# Patient Record
Sex: FEMALE | Race: WHITE | NOT HISPANIC OR LATINO | Employment: UNEMPLOYED | URBAN - METROPOLITAN AREA
[De-identification: names, ages, dates, MRNs, and addresses within clinical notes are randomized per-mention and may not be internally consistent; named-entity substitution may affect disease eponyms.]

---

## 2017-05-03 ENCOUNTER — ALLSCRIPTS OFFICE VISIT (OUTPATIENT)
Dept: OTHER | Facility: OTHER | Age: 42
End: 2017-05-03

## 2017-11-02 ENCOUNTER — ALLSCRIPTS OFFICE VISIT (OUTPATIENT)
Dept: OTHER | Facility: OTHER | Age: 42
End: 2017-11-02

## 2018-01-09 NOTE — PSYCH
Psych Med Mgmt    Appearance: was calm and cooperative, adequate hygiene and grooming and good eye contact  Observed mood: anxious  Observed mood: affect was constricted  Speech: a normal rate and fluent  Thought processes: coherent/organized  Hallucinations: no hallucinations present  Thought Content: no delusions  Abnormal Thoughts: The patient has no suicidal thoughts and no homicidal thoughts  Orientation: The patient is oriented to person, place and time, oriented to person, oriented to place and oriented to time  Recent and Remote Memory: short term memory intact and long term memory intact  Attention Span And Concentration: concentration impaired  Insight: Limited insight  Judgment: Her judgment was limited  Muscle Strength And Tone  Muscle strength and tone were normal    The patient is experiencing no localized pain  Goals addressed in session: medication management       Treatment Recommendations: continue current treatment  Risks, Benefits And Possible Side Effects Of Medications: Risks, benefits, and possible side effects of medications explained to patient and patient verbalizes understanding  Mood has improved  Reported less anxiety and stated that she is not currently experiencing side effects   No health changes   No new medications  Assessment    1  Generalized anxiety disorder (300 02) (F41 1)   2  Attention-deficit/hyperactivity disorder (314 01) (F90 9)    Plan    1  Sertraline HCl - 100 MG Oral Tablet; TAKE 1 TABLET ONCE DAILY    2  Amphetamine-Dextroamphet ER 10 MG Oral Capsule Extended Release 24   Hour; TAKE 1 CAPSULE DAILY FOR ADHD; Do Not Fill Before: 11Tob5035    Review of Systems    Constitutional: as noted in HPI  Substance Abuse Hx    Substance Abuse History: Denies  Active Problems    1  Attention-deficit/hyperactivity disorder (314 01) (F90 9)   2  Generalized anxiety disorder (300 02) (F41 1)   3   Vitamin D deficiency (268 9) (E55 9)    Past Medical History    The active problems and past medical history were reviewed and updated today  Allergies    1  No Known Drug Allergies    Current Meds   1  Amphetamine-Dextroamphet ER 10 MG Oral Capsule Extended Release 24 Hour; TAKE   1 CAPSULE DAILY FOR ADHD; Therapy: 13ZOW1238 to (Evaluate:20Nov2016); Last Rx:02Jfx2426 Ordered   2  Sertraline HCl - 100 MG Oral Tablet; TAKE 1 TABLET ONCE DAILY; Therapy: 47Yzj3137 to (Robert Tang)  Requested for: 88Dtx4408; Last   Rx:58Phl9600 Ordered   3  Vitamin D3 2000 UNIT Oral Capsule; TAKE 1 CAPSULE Once In The Morning; Therapy: 02FLC8876 to (Evaluate:17Aug2016)  Requested for: 97HZK7032; Last   Rx:81Pss8076 Ordered    The medication list was reviewed and updated today  Family Psych History  Family History    1  No pertinent family history    The family history was reviewed and updated today  Social History    · Never A Smoker  The social history was reviewed and updated today  The social history was reviewed and is unchanged  End of Encounter Meds    1  Sertraline HCl - 100 MG Oral Tablet; TAKE 1 TABLET ONCE DAILY; Therapy: 95Aik2478 to (Jama Vizcarra)  Requested for: 26Oct2016; Last   Rx:26Oct2016; Status: ACTIVE - Transmit to [facility] Ordered    2  Amphetamine-Dextroamphet ER 10 MG Oral Capsule Extended Release 24 Hour; TAKE   1 CAPSULE DAILY FOR ADHD; Therapy: 02NYR9660 to (Evaluate:19Jan2017); Last Rx:26Oct2016 Ordered   3  Vitamin D3 2000 UNIT Oral Capsule; TAKE 1 CAPSULE Once In The Morning;    Therapy: 47XHA8551 to (Evaluate:17Aug2016)  Requested for: 16RBC9765; Last   Rx:72Pjg5834 Ordered    Signatures   Electronically signed by : TEAGAN Guzmán ; Oct 26 2016  3:21PM EST                       (Author)

## 2018-01-11 NOTE — PSYCH
Psych Med Mgmt    Appearance: was calm and cooperative, adequate hygiene and grooming and good eye contact  Observed mood: mood appropriate  Observed mood: affect appropriate  Speech: a normal rate and fluent  Thought processes: coherent/organized  Hallucinations: no hallucinations present  Thought Content: no delusions  Abnormal Thoughts: The patient has no suicidal thoughts and no homicidal thoughts  Orientation: The patient is oriented to person, place and time, oriented to person, oriented to place and oriented to time  Recent and Remote Memory: short term memory intact and long term memory intact  Attention Span And Concentration: concentration impaired  Insight: Limited insight  Judgment: Her judgment was limited  Muscle Strength And Tone  Muscle strength and tone were normal    The patient is experiencing no localized pain  Goals addressed in session: Medication Management       Treatment Recommendations: Continue current regimen  Risks, Benefits And Possible Side Effects Of Medications: Risks, benefits, and possible side effects of medications explained to patient and patient verbalizes understanding  She reports normal appetite, normal energy level, no weight change and normal number of sleep hours  Mood has been stable   Denies medication side effects   No recent health changes   No new medications  Assessment    1  Attention-deficit/hyperactivity disorder (314 01) (F90 9)   2  Generalized anxiety disorder (300 02) (F41 1)    Plan    1  Sertraline HCl - 100 MG Oral Tablet; take 1 tablet by mouth every day    2  Amphetamine-Dextroamphet ER 10 MG Oral Capsule Extended Release 24   Hour; TAKE 1 CAPSULE DAILY FOR ADHD; Do Not Fill Before: 87KDM2779    Review of Systems    Constitutional: as noted in HPI  Substance Abuse Hx    Substance Abuse History: Denies  Active Problems    1  Attention-deficit/hyperactivity disorder (314 01) (F90 9)   2  Generalized anxiety disorder (300 02) (F41 1)   3  Vitamin D deficiency (268 9) (E55 9)    Past Medical History    The active problems and past medical history were reviewed and updated today  Allergies    1  No Known Drug Allergies    Current Meds   1  Amphetamine-Dextroamphet ER 10 MG Oral Capsule Extended Release 24 Hour; TAKE   1 CAPSULE DAILY FOR ADHD; Therapy: 09KUH3319 to (Evaluate:19Jan2017); Last Rx:26Oct2016 Ordered   2  Sertraline HCl - 100 MG Oral Tablet; take 1 tablet by mouth every day; Therapy: 49Jen7232 to (Evaluate:22Jun2017)  Requested for: 24Mar2017; Last   Rx:24Mar2017 Ordered   3  Vitamin D3 2000 UNIT Oral Capsule; TAKE 1 CAPSULE Once In The Morning; Therapy: 58UQE0104 to (Evaluate:17Aug2016)  Requested for: 31NOP3558; Last   Rx:97Rhq0445 Ordered    The medication list was reviewed and updated today  Family Psych History  Family History    1  No pertinent family history    The family history was reviewed and updated today  Social History    · Never A Smoker  The social history was reviewed and updated today  The social history was reviewed and is unchanged  End of Encounter Meds    1  Sertraline HCl - 100 MG Oral Tablet; take 1 tablet by mouth every day; Therapy: 92Xhr2908 to (Evaluate:11Szo1456)  Requested for: 27UVM2966; Last   UK:80WSL6517; Status: ACTIVE - Transmit to [facility] Ordered    2  Amphetamine-Dextroamphet ER 10 MG Oral Capsule Extended Release 24 Hour; TAKE   1 CAPSULE DAILY FOR ADHD; Therapy: 45IMY1167 to (Evaluate:03Khu0061); Last UR:75BPY8218 Ordered   3  Vitamin D3 2000 UNIT Oral Capsule; TAKE 1 CAPSULE Once In The Morning;    Therapy: 88OPB6416 to (Evaluate:17Aug2016)  Requested for: 20CDL6592; Last   Rx:93Iuq4423 Ordered    Signatures   Electronically signed by : TEAGAN Gomez ; May  3 2017 11:37AM EST                       (Author)

## 2018-01-11 NOTE — PSYCH
Psych Med Mgmt    Appearance: was calm and cooperative, adequate hygiene and grooming and good eye contact  Observed mood: mood appropriate  Observed mood: affect appropriate  Speech: a normal rate  Thought processes: coherent/organized  Hallucinations: no hallucinations present  Thought Content: no delusions  Abnormal Thoughts: The patient has no suicidal thoughts and no homicidal thoughts  Orientation: The patient is oriented to person, place and time, oriented to person, oriented to place and oriented to time  Recent and Remote Memory: short term memory intact and long term memory intact  Attention Span And Concentration: concentration impaired  Insight: Limited insight  Judgment: Her judgment was limited  Muscle Strength And Tone  Muscle strength and tone were normal         Goals addressed in session: Medication Management       Treatment Recommendations: Continue current treatment  Risks, Benefits And Possible Side Effects Of Medications: Risks, benefits, and possible side effects of medications explained to patient and patient verbalizes understanding  The patient has been filling controlled prescriptions on time as prescribed to [facility]       She reports normal appetite, decreased energy, no weight change and normal number of sleep hours  Patient was last seen in May  She stated that once her body became used to Sertraline her depression and anxiety improved  She only uses Adderall prn and still has enough supplies   She denies recent health changes or new medications   She feels less fatigued  Continue current treatment  Assessment    1  Generalized anxiety disorder (300 02) (F41 1)   2  Attention-deficit/hyperactivity disorder (314 01) (F90 9)    Plan    1  Sertraline HCl - 100 MG Oral Tablet; take 1 tablet by mouth every day    2   Amphetamine-Dextroamphet ER 10 MG Oral Capsule Extended Release 24   Hour; TAKE 1 CAPSULE DAILY FOR ADHD    Review of Systems    Constitutional: No fever, no chills, feels well, no tiredness, no recent weight gain or loss  Cardiovascular: no complaints of slow or fast heart rate, no chest pain, no palpitations  Respiratory: no complaints of shortness of breath, no wheezing, no dyspnea on exertion  Gastrointestinal: no complaints of abdominal pain, no constipation, no nausea, no diarrhea, no vomiting  Genitourinary: no complaints of dysuria, no incontinence, no pelvic pain, no urinary frequency  Musculoskeletal: no complaints of arthralgia, no myalgias, no limb pain, no joint stiffness  Integumentary: no complaints of skin rash, no itching, no dry skin  Neurological: no complaints of headache, no confusion, no numbness, no dizziness  Substance Abuse Hx    Substance Abuse History: Denies  Active Problems    1  Attention-deficit/hyperactivity disorder (314 01) (F90 9)   2  Generalized anxiety disorder (300 02) (F41 1)   3  Vitamin D deficiency (268 9) (E55 9)    Past Medical History    The active problems and past medical history were reviewed and updated today  Allergies    1  No Known Drug Allergies    Current Meds   1  Amphetamine-Dextroamphet ER 10 MG Oral Capsule Extended Release 24 Hour; TAKE   1 CAPSULE DAILY FOR ADHD; Therapy: 24BTA0823 to (Evaluate:17Bhv2598); Last ED:34BSE4375 Ordered   2  Sertraline HCl - 100 MG Oral Tablet; take 1 tablet by mouth every day; Therapy: 30Ckw8616 to (Evaluate:22Nov2017)  Requested for: 70Rhn1263; Last   Rx:58Hha4219 Ordered   3  Vitamin D3 2000 UNIT Oral Capsule; TAKE 1 CAPSULE Once In The Morning; Therapy: 75EEE5391 to (Evaluate:99Lvk3134)  Requested for: 91BFD0504; Last   Rx:05Lyl9607 Ordered    The medication list was reviewed and updated today  Family Psych History  Family History    1  No pertinent family history    The family history was reviewed and updated today         Social History    · Never A Smoker  The social history was reviewed and updated today  The social history was reviewed and is unchanged  End of Encounter Meds    1  Sertraline HCl - 100 MG Oral Tablet; take 1 tablet by mouth every day; Therapy: 67Skf0573 to (Evaluate:31Jan2018)  Requested for: 57DZY3979; Last   Rx:02Nov2017 Ordered    2  Amphetamine-Dextroamphet ER 10 MG Oral Capsule Extended Release 24 Hour; TAKE   1 CAPSULE DAILY FOR ADHD; Therapy: 26PUD9382 to (Evaluate:80Yny1963); Last Rx:02Nov2017 Ordered   3  Vitamin D3 2000 UNIT Oral Capsule; TAKE 1 CAPSULE Once In The Morning;    Therapy: 60XLE3329 to (Evaluate:17Aug2016)  Requested for: 47YKZ5982; Last   Rx:47Hmd3962 Ordered    Signatures   Electronically signed by : TEAGAN Hatfield ; Nov 2 2017  1:55PM EST                       (Author)

## 2018-01-12 NOTE — PSYCH
Psych Med Mgmt    Appearance: was calm and cooperative, adequate hygiene and grooming and good eye contact  Observed mood: depressed and anxious  Observed mood: affect was constricted  Speech: a normal rate and fluent  Thought processes: coherent/organized  Hallucinations: no hallucinations present  Thought Content: no delusions  Abnormal Thoughts: The patient has no suicidal thoughts and no homicidal thoughts  Orientation: The patient is oriented to person, place and time, oriented to person, oriented to place and oriented to time  Recent and Remote Memory: short term memory intact and long term memory intact  Attention Span And Concentration: concentration impaired  Insight: Limited insight  Judgment: Her judgment was limited  Muscle Strength And Tone  Muscle strength and tone were normal    The patient is experiencing no localized pain  Goals addressed in session: Medication Management       Treatment Recommendations: Patient stated that Paxil CR 25 mg caused her side effects and when she tried going back to 12 5 mg she has not been feeling well  Risks, Benefits And Possible Side Effects Of Medications: Risks, benefits, and possible side effects of medications explained to patient and patient verbalizes understanding  She reports normal appetite, normal energy level, no weight change and normal number of sleep hours  Mood is depressed and anxious she has a hard time with side effects when dose of Paxil CR went up to 25 mg but also became more depressed and anxious coming off from Paxil CR 25 mg to 12 5 mg  She is afraid of having withdrawal symptoms from Paxil and wishes to switch to Zoloft   Will Start Zoloft 50 mg qhs and  Assessment    1  Attention-deficit/hyperactivity disorder (314 01) (F90 9)   2  Generalized anxiety disorder (300 02) (F41 1)    Plan    1  PARoxetine HCl ER 25 MG Oral Tablet Extended Release 24 Hour   2   PARoxetine HCl - 10 MG Oral Tablet; Take 1 5 tabs po qd x 7 days, then 1 tab po   qd x 7days then 0 5 tab po qd x 7 days then stop   3  Sertraline HCl - 50 MG Oral Tablet; Take 1 tab po qd x7 days, then 1 5 tab poqd x   7days then 2 tabs po qd x 7days    4  Amphetamine-Dextroamphet ER 10 MG Oral Capsule Extended Release 24   Hour; TAKE 1 CAPSULE DAILY FOR ADHD; Do Not Fill Before: 28FLH7209    Review of Systems    Constitutional: as noted in HPI  Substance Abuse Hx    Substance Abuse History: denies  Active Problems    1  Attention-deficit/hyperactivity disorder (314 01) (F90 9)   2  Generalized anxiety disorder (300 02) (F41 1)   3  Vitamin D deficiency (268 9) (E55 9)    Past Medical History    The active problems and past medical history were reviewed and updated today  Allergies    1  No Known Drug Allergies    Current Meds   1  Amphetamine-Dextroamphet ER 10 MG Oral Capsule Extended Release 24 Hour; TAKE   1 CAPSULE DAILY FOR ADHD; Therapy: 44RVX4500 to (Evaluate:18Jun2016); Last Rx:57Rmh2924 Ordered   2  PARoxetine HCl ER 25 MG Oral Tablet Extended Release 24 Hour; take 1 tablet by mouth   every day; Therapy: 61Ocs7026 to (Evaluate:23Oct2016)  Requested for: 57Wnj2973; Last   Rx:25Jhq6013 Ordered   3  Vitamin D3 2000 UNIT Oral Capsule; TAKE 1 CAPSULE Once In The Morning; Therapy: 95JBT1079 to (Evaluate:17Aug2016)  Requested for: 65CCL6759; Last   Rx:11Gln5053 Ordered    The medication list was reviewed and updated today  Family Psych History  Family History    1  No pertinent family history    The family history was reviewed and updated today  Social History    · Never A Smoker  The social history was reviewed and updated today  The social history was reviewed and is unchanged  End of Encounter Meds    1  PARoxetine HCl - 10 MG Oral Tablet; Take 1 5 tabs po qd x 7 days, then 1 tab po qd   x 7days then 0 5 tab po qd x 7 days then stop;    Therapy: 41Akd2130 to (Evaluate:81Dju5086)  Requested for: 53Gzv8120; Last   Rx:63Izs6362; Status: ACTIVE - Transmit to Pharmacy - Awaiting Verification Ordered   2  Sertraline HCl - 50 MG Oral Tablet; Take 1 tab po qd x7 days, then 1 5 tab poqd x 7days   then 2 tabs po qd x 7days; Therapy: 94Jdr3409 to (Evaluate:43Oir0359)  Requested for: 13Pkd9956; Last   Rx:03Rrh5199; Status: [address] to Pharmacy - Awaiting Verification Ordered    3  Amphetamine-Dextroamphet ER 10 MG Oral Capsule Extended Release 24 Hour; TAKE   1 CAPSULE DAILY FOR ADHD; Therapy: 46BHL8923 to (Evaluate:20Nov2016); Last Rx:10Wwk2440 Ordered   4  Vitamin D3 2000 UNIT Oral Capsule; TAKE 1 CAPSULE Once In The Morning;    Therapy: 19QZE0712 to (Evaluate:17Aug2016)  Requested for: 09UMV1715; Last   Rx:19Hml3328 Ordered    Signatures   Electronically signed by : TEAGAN Joyner ; Aug 22 2016  3:43PM EST                       (Author)

## 2018-01-16 NOTE — PSYCH
Psych Med Mgmt    Appearance: was calm and cooperative and adequate hygiene and grooming  Observed mood: mood appropriate  Observed mood: affect appropriate  Speech: a normal rate  Thought processes: coherent/organized  Hallucinations: no hallucinations present  Thought Content: no delusions  Abnormal Thoughts: The patient has no suicidal thoughts and no homicidal thoughts  Orientation: The patient is oriented to person, place and time, oriented to person, oriented to place and oriented to time  Recent and Remote Memory: short term memory intact and long term memory intact  Attention Span And Concentration: concentration intact  Insight: Insight intact  Judgment: Her judgment was intact  Muscle Strength And Tone  Muscle strength and tone were normal    The patient is experiencing no localized pain  Goals addressed in session: Medication Management       Treatment Recommendations: Continue current medications  Risks, Benefits And Possible Side Effects Of Medications: Risks, benefits, and possible side effects of medications explained to patient and patient verbalizes understanding  She reports normal appetite, normal energy level, no weight change and normal number of sleep hours  Mood has improved  Motivation has improved   Attention and concentration  Vitals  Signs [Data Includes: Current Encounter]   Recorded: 12Apr2016 01:38PM   Height: 5 ft 2 in  Weight: 115 lb   BMI Calculated: 21 03  BSA Calculated: 1 51    Assessment    1  Attention-deficit hyperactivity disorder (314 01) (F90 9)   2  Generalized anxiety disorder (300 02) (F41 1)    Plan    1  PARoxetine HCl ER 25 MG Oral Tablet Extended Release 24 Hour; TAKE 1   TABLET DAILY    2   Amphetamine-Dextroamphet ER 10 MG Oral Capsule Extended Release 24   Hour; TAKE 1 CAPSULE DAILY FOR ADHD    Review of Systems    Constitutional: No fever, no chills, feels well, no tiredness, no recent weight gain or loss and as noted in HPI  Cardiovascular: no complaints of slow or fast heart rate, no chest pain, no palpitations  Respiratory: no complaints of shortness of breath, no wheezing, no dyspnea on exertion  Gastrointestinal: no complaints of abdominal pain, no constipation, no nausea, no diarrhea, no vomiting  Genitourinary: no complaints of dysuria, no incontinence, no pelvic pain, no urinary frequency  Musculoskeletal: no complaints of arthralgia, no myalgias, no limb pain, no joint stiffness  Integumentary: no complaints of skin rash, no itching, no dry skin  Neurological: no complaints of headache, no confusion, no numbness, no dizziness  Substance Abuse Hx    Substance Abuse History: Denies  Active Problems    1  Attention-deficit hyperactivity disorder (314 01) (F90 9)   2  Generalized anxiety disorder (300 02) (F41 1)   3  Vitamin D deficiency (268 9) (E55 9)    Past Medical History    The active problems and past medical history were reviewed and updated today  Allergies    1  No Known Drug Allergies    Current Meds   1  Amphetamine-Dextroamphet ER 10 MG Oral Capsule Extended Release 24 Hour; TAKE   1 CAPSULE DAILY FOR ADHD; Therapy: 65FDU7553 to (Evaluate:56Itj6962); Last Rx:57Bqy7484 Ordered   2  PARoxetine HCl ER 12 5 MG Oral Tablet Extended Release 24 Hour; take one tablet by   mouth one time daily; Therapy: 96Dsu9642 to (Evaluate:92Mve8549)  Requested for: 15KOX1627; Last   Rx:68Lda4706 Ordered   3  Vitamin D3 2000 UNIT Oral Capsule; TAKE 1 CAPSULE Once In The Morning; Therapy: 14GGK5605 to (Evaluate:08Aqs6455)  Requested for: 33AAN0992; Last   Rx:98Gwz7332 Ordered    The medication list was reviewed and updated today  Family Psych History    1  No pertinent family history    The family history was reviewed and updated today  Social History    · Never A Smoker  The social history was reviewed and updated today  The social history was reviewed and is unchanged        End of Encounter Meds    1  PARoxetine HCl ER 25 MG Oral Tablet Extended Release 24 Hour; TAKE 1 TABLET   DAILY; Therapy: 88Kbr1344 to (Evaluate:62Ehv4881)  Requested for: 12Apr2016; Last   Rx:12Apr2016 Ordered    2  Amphetamine-Dextroamphet ER 10 MG Oral Capsule Extended Release 24 Hour; TAKE   1 CAPSULE DAILY FOR ADHD; Therapy: 08AOH4264 to (Evaluate:18Jun2016); Last Rx:12Apr2016 Ordered   3  Vitamin D3 2000 UNIT Oral Capsule; TAKE 1 CAPSULE Once In The Morning;    Therapy: 00LEY0606 to (Evaluate:17Aug2016)  Requested for: 48EVW9737; Last   Rx:64Ata0876 Ordered    Signatures   Electronically signed by : TEAGAN Acosta ; Apr 12 2016  1:41PM EST                       (Author)

## 2018-01-17 NOTE — PSYCH
Psych Med Mgmt    Appearance: was calm and cooperative, adequate hygiene and grooming and good eye contact  Observed mood: anxious  Observed mood: affect was constricted  Speech: a normal rate and fluent  Thought processes: coherent/organized  Hallucinations: no hallucinations present  Thought Content: no delusions  Abnormal Thoughts: The patient has no suicidal thoughts and no homicidal thoughts  Orientation: The patient is oriented to person, place and time, oriented to person, oriented to place and oriented to time  Recent and Remote Memory: short term memory intact and long term memory intact  Attention Span And Concentration: concentration impaired  Insight: Limited insight  Judgment: Her judgment was limited  Muscle Strength And Tone  Muscle strength and tone were normal    The patient is experiencing no localized pain  Goals addressed in session: Medication Management       Treatment Recommendations: Patient is off Paroxetine   Will continue to titrate Sertraline  Risks, Benefits And Possible Side Effects Of Medications: Risks, benefits, and possible side effects of medications explained to patient and patient verbalizes understanding  She reports normal appetite, normal energy level, no weight change and normal number of sleep hours  Mood has stabilized as she gradually went off Paxil and switched over to Zoloft   She has some fatigue but no other side effects reported  Vitals  Signs   Recorded: 14Mlg8960 04:29PM   Height: 5 ft 2 in  Weight: 115 lb   BMI Calculated: 21 03  BSA Calculated: 1 51    Assessment    1  Generalized anxiety disorder (300 02) (F41 1)   2  Attention-deficit/hyperactivity disorder (314 01) (F90 9)    Plan    1  Sertraline HCl - 100 MG Oral Tablet; TAKE 1 TABLET ONCE DAILY   2  PARoxetine HCl - 10 MG Oral Tablet    3  Amphetamine-Dextroamphet ER 10 MG Oral Capsule Extended Release 24   Hour; TAKE 1 CAPSULE DAILY FOR ADHD;  Do Not Fill Before: [id number]    Review of Systems    Constitutional: as noted in HPI  Substance Abuse Hx    Substance Abuse History: denies  Active Problems    1  Attention-deficit/hyperactivity disorder (314 01) (F90 9)   2  Generalized anxiety disorder (300 02) (F41 1)   3  Vitamin D deficiency (268 9) (E55 9)    Past Medical History    The active problems and past medical history were reviewed and updated today  Allergies    1  No Known Drug Allergies    Current Meds   1  Amphetamine-Dextroamphet ER 10 MG Oral Capsule Extended Release 24 Hour; TAKE   1 CAPSULE DAILY FOR ADHD; Therapy: 44QOC4971 to (Evaluate:20Nov2016); Last Rx:42Upm2527 Ordered   2  PARoxetine HCl - 10 MG Oral Tablet; Take 1 5 tabs po qd x 7 days, then 1 tab po qd   x 7days then 0 5 tab po qd x 7 days then stop; Therapy: 55Lkh8278 to (Evaluate:19Sep2016)  Requested for: 36Nab0683; Last   Rx:53Oqt0047 Ordered   3  Sertraline HCl - 100 MG Oral Tablet; TAKE 1 TABLET ONCE DAILY; Therapy: 37Agk9812 to (Tacey Galea)  Requested for: 87MMQ9353; Last   Rx:26Gqw0247 Ordered   4  Vitamin D3 2000 UNIT Oral Capsule; TAKE 1 CAPSULE Once In The Morning; Therapy: 97EPZ2627 to (Evaluate:17Aug2016)  Requested for: 58RTY0838; Last   Rx:96Jyx8006 Ordered    The medication list was reviewed and updated today  Family Psych History  Family History    1  No pertinent family history    The family history was reviewed and updated today  Social History    · Never A Smoker  The social history was reviewed and updated today  The social history was reviewed and is unchanged  End of Encounter Meds    1  Sertraline HCl - 100 MG Oral Tablet; TAKE 1 TABLET ONCE DAILY; Therapy: 04Npb2389 to (Tacey Galea)  Requested for: 81Bqz2348; Last   Rx:01Znc9983 Ordered    2  Amphetamine-Dextroamphet ER 10 MG Oral Capsule Extended Release 24 Hour; TAKE   1 CAPSULE DAILY FOR ADHD; Therapy: 30YCN3266 to (Evaluate:20Nov2016);  Last Rx:31Qiu3490 Ordered   3  Vitamin D3 2000 UNIT Oral Capsule; TAKE 1 CAPSULE Once In The Morning;    Therapy: 35PLY1891 to (Evaluate:95Hgw3225)  Requested for: 44PTU8464; Last   Rx:52Azc0453 Ordered    Signatures   Electronically signed by : TEAGAN Hernandez ; Sep 15 2016  4:32PM EST                       (Author)

## 2018-01-17 NOTE — PSYCH
Psych Med Mgmt    Appearance: was calm and cooperative, adequate hygiene and grooming and good eye contact  Observed mood: depressed and anxious  Observed mood: affect was constricted  Speech: a normal rate and fluent  Thought processes: coherent/organized  Hallucinations: no hallucinations present  Thought Content: no delusions  Abnormal Thoughts: The patient has no suicidal thoughts and no homicidal thoughts  Orientation: The patient is oriented to person, place and time, oriented to person, oriented to place and oriented to time  Recent and Remote Memory: short term memory intact and long term memory intact  Attention Span And Concentration: concentration intact  Insight: Limited insight  Judgment: Her judgment was limited  Muscle Strength And Tone  Muscle strength and tone were normal    The patient is experiencing moderate to severe pain  Abdominal pain  Goals addressed in session: Medication Management          Treatment Recommendations: Patient D/C Wellbutrin because she started having abdominal pain she felt a little better off of it but pain came back and now has EGD scheduled and Abdominal CT  Risks, Benefits And Possible Side Effects Of Medications: Risks, benefits, and possible side effects of medications explained to patient and patient verbalizes understanding  She reports decreased appetite, decreased energy, no weight change and normal number of sleep hours  Still struggles with anxiety and her mind is all over the place  Vitals  Signs [Data Includes: Current Encounter]   Recorded: 82LPT9205 11:09AM   Height: 5 ft 2 in  Weight: 115 lb   BMI Calculated: 21 03  BSA Calculated: 1 51    Assessment    1  Generalized anxiety disorder (300 02) (F41 1)   2  Vitamin D deficiency (268 9) (E55 9)   3  Attention-deficit/hyperactivity disorder (314 01) (F90 9)    Plan    1   PARoxetine HCl ER 12 5 MG Oral Tablet Extended Release 24 Hour; take one   tablet by mouth one time daily    2  Amphetamine-Dextroamphet ER 10 MG Oral Capsule Extended Release 24   Hour; TAKE 1 CAPSULE DAILY FOR ADHD   3  Vitamin D3 2000 UNIT Oral Capsule; TAKE 1 CAPSULE Once In The Morning    Review of Systems    Constitutional: No fever, no chills, feels well, no tiredness, no recent weight gain or loss  Cardiovascular: no complaints of slow or fast heart rate, no chest pain, no palpitations  Respiratory: no complaints of shortness of breath, no wheezing, no dyspnea on exertion  Gastrointestinal: no complaints of abdominal pain, no constipation, no nausea, no diarrhea, no vomiting  Genitourinary: no complaints of dysuria, no incontinence, no pelvic pain, no urinary frequency  Musculoskeletal: no complaints of arthralgia, no myalgias, no limb pain, no joint stiffness  Integumentary: no complaints of skin rash, no itching, no dry skin  Neurological: no complaints of headache, no confusion, no numbness, no dizziness  Substance Abuse Hx    Substance Abuse History: Denies  Active Problems    1  Attention-deficit/hyperactivity disorder (314 01) (F90 9)   2  Generalized anxiety disorder (300 02) (F41 1)   3  Vitamin D deficiency (268 9) (E55 9)    Past Medical History    The active problems and past medical history were reviewed and updated today  Allergies    1  No Known Drug Allergies    Current Meds   1  PARoxetine HCl ER 12 5 MG Oral Tablet Extended Release 24 Hour; take one tablet by   mouth one time daily; Therapy: 68Wqi1506 to (Evaluate:25Mar2016)  Requested for: 67LIS8807; Last   Rx:25Jan2016 Ordered   2  Vitamin D3 2000 UNIT Oral Capsule; TAKE 1 CAPSULE Once In The Morning; Therapy: 44RUJ4746 to (Azell Milling)  Requested for: 29SZB1581; Last   Rx:19Jan2015 Ordered    The medication list was reviewed and updated today  Family Psych History    1  No pertinent family history    The family history was reviewed and updated today         Social History    · Never A Smoker  The social history was reviewed and updated today  The social history was reviewed and is unchanged  End of Encounter Meds    1  PARoxetine HCl ER 12 5 MG Oral Tablet Extended Release 24 Hour; take one tablet by   mouth one time daily; Therapy: 04Szm1931 to (Evaluate:66Qmt6231)  Requested for: 59IQZ2503; Last   Rx:[id number] Ordered    2  Amphetamine-Dextroamphet ER 10 MG Oral Capsule Extended Release 24 Hour; TAKE   1 CAPSULE DAILY FOR ADHD; Therapy: 00YVM5574 to (Evaluate:18Apr2016); Last Rx:13Yvl4495 Ordered   3  Vitamin D3 2000 UNIT Oral Capsule; TAKE 1 CAPSULE Once In The Morning;    Therapy: 72YOK6835 to (Evaluate:17Aug2016)  Requested for: 87EYQ9540; Last   Rx:23Qzc1322 Ordered    Signatures   Electronically signed by : TEAGAN Cueva ; Feb 19 2016 11:19AM EST                       (Author)

## 2018-02-11 DIAGNOSIS — F33.2 MDD (MAJOR DEPRESSIVE DISORDER), RECURRENT SEVERE, WITHOUT PSYCHOSIS (HCC): Primary | ICD-10-CM

## 2018-02-11 RX ORDER — SERTRALINE HYDROCHLORIDE 100 MG/1
TABLET, FILM COATED ORAL
Qty: 90 TABLET | Refills: 0 | Status: SHIPPED | OUTPATIENT
Start: 2018-02-11 | End: 2018-05-08 | Stop reason: SDUPTHER

## 2018-05-08 DIAGNOSIS — F33.2 MDD (MAJOR DEPRESSIVE DISORDER), RECURRENT SEVERE, WITHOUT PSYCHOSIS (HCC): ICD-10-CM

## 2018-05-08 RX ORDER — SERTRALINE HYDROCHLORIDE 100 MG/1
TABLET, FILM COATED ORAL
Qty: 90 TABLET | Refills: 0 | Status: SHIPPED | OUTPATIENT
Start: 2018-05-08 | End: 2018-08-09 | Stop reason: SDUPTHER

## 2018-08-09 ENCOUNTER — OFFICE VISIT (OUTPATIENT)
Dept: PSYCHIATRY | Facility: CLINIC | Age: 43
End: 2018-08-09
Payer: COMMERCIAL

## 2018-08-09 DIAGNOSIS — F41.1 GENERALIZED ANXIETY DISORDER: ICD-10-CM

## 2018-08-09 DIAGNOSIS — F42.2 MIXED OBSESSIONAL THOUGHTS AND ACTS: ICD-10-CM

## 2018-08-09 DIAGNOSIS — F90.2 ATTENTION DEFICIT HYPERACTIVITY DISORDER (ADHD), COMBINED TYPE: ICD-10-CM

## 2018-08-09 DIAGNOSIS — F33.2 MDD (MAJOR DEPRESSIVE DISORDER), RECURRENT SEVERE, WITHOUT PSYCHOSIS (HCC): Primary | ICD-10-CM

## 2018-08-09 PROCEDURE — 99213 OFFICE O/P EST LOW 20 MIN: CPT | Performed by: PSYCHIATRY & NEUROLOGY

## 2018-08-09 RX ORDER — ACETAMINOPHEN 160 MG
1 TABLET,DISINTEGRATING ORAL DAILY
COMMUNITY
Start: 2015-01-19

## 2018-08-09 RX ORDER — SERTRALINE HYDROCHLORIDE 100 MG/1
100 TABLET, FILM COATED ORAL DAILY
Qty: 90 TABLET | Refills: 0 | Status: SHIPPED | OUTPATIENT
Start: 2018-08-09 | End: 2018-11-20 | Stop reason: SDUPTHER

## 2018-08-09 RX ORDER — DEXTROAMPHETAMINE SACCHARATE, AMPHETAMINE ASPARTATE MONOHYDRATE, DEXTROAMPHETAMINE SULFATE AND AMPHETAMINE SULFATE 2.5; 2.5; 2.5; 2.5 MG/1; MG/1; MG/1; MG/1
1 CAPSULE, EXTENDED RELEASE ORAL DAILY
COMMUNITY
Start: 2016-02-19 | End: 2018-08-09 | Stop reason: ALTCHOICE

## 2018-08-09 RX ORDER — CLOMIPRAMINE HYDROCHLORIDE 25 MG/1
25 CAPSULE ORAL
Qty: 30 CAPSULE | Refills: 2 | Status: SHIPPED | OUTPATIENT
Start: 2018-08-09 | End: 2018-09-10 | Stop reason: ALTCHOICE

## 2018-08-09 NOTE — PSYCH
Subjective: Medication Management      Patient ID: Royer Barger is a 39 y o  female  HPI ROS Appetite Changes and Sleep: normal appetite, decreased energy, no weight change and normal number of sleep hours   Patient stated she is not depressed but she still struggles with obsessive thoughts and compulsive behavior  In the past a dose increase in sertraline caused too much daytime fatigue and sedation  Will add low dose Clomipramine instead  Review Of Systems:     Mood Anxiety   Behavior Compulsive Behavior   Thought Content Disturbing Thoughts, Feelings and Unreasonalbe or Irrational Fears   General Emotional Problems   Personality Normal   Other Psych Symptoms Normal   Constitutional Negative   ENT Negative   Cardiovascular Negative   Respiratory Negative   Gastrointestinal Negative   Genitourinary Negative   Musculoskeletal Negative   Integumentary Negative   Neurological Negative   Endocrine Normal    Other Symptoms Normal              Laboratory Results: No results found for this or any previous visit  Substance Abuse History:  History   Drug use: Unknown       Family Psychiatric History: No family history on file  The following portions of the patient's history were reviewed and updated as appropriate: allergies, current medications, past family history, past medical history, past social history, past surgical history and problem list     Social History     Social History    Marital status: /Civil Union     Spouse name: N/A    Number of children: N/A    Years of education: N/A     Occupational History    Not on file       Social History Main Topics    Smoking status: Not on file    Smokeless tobacco: Not on file    Alcohol use Not on file    Drug use: Unknown    Sexual activity: Not on file     Other Topics Concern    Not on file     Social History Narrative    No narrative on file     Social History     Social History Narrative    No narrative on file       Objective: Mental status:  Appearance calm and cooperative , adequate hygiene and grooming and good eye contact    Mood anxious   Affect affect was constricted   Speech a normal rate   Thought Processes coherent/organized and normal thought processes   Hallucinations no hallucinations present    Thought Content no delusions   Abnormal Thoughts no suicidal thoughts  and no homicidal thoughts    Orientation  oriented to person and place and time   Remote Memory short term memory intact and long term memory intact   Attention Span concentration impaired   Intellect Appears to be of Average Intelligence   Insight Limited insight   Judgement judgment was limited   Muscle Strength Muscle strength and tone were normal and Normal gait    Language no difficulty naming common objects and no difficulty repeating a phrase    Fund of Knowledge displays adequate knowledge of current events, adequate fund of knowledge regarding past history and adequate fund of knowledge regarding vocabulary    Pain none   Pain Scale 0       Assessment/Plan:       Diagnoses and all orders for this visit:    MDD (major depressive disorder), recurrent severe[facility]  -     sertraline (ZOLOFT) 100 mg tablet; Take 1 tablet (100 mg total) by mouth daily  -     clomiPRAMINE (ANAFRANIL) 25 mg capsule; Take 1 capsule (25 mg total) by mouth daily at bedtime    Generalized anxiety disorder    Attention deficit hyperactivity disorder (ADHD), combined type    Mixed obsessional thoughts and acts    Other orders  -     Discontinue: amphetamine-dextroamphetamine (ADDERALL XR) 10 MG 24 hr capsule; Take 1 capsule by mouth daily  -     Cholecalciferol (VITAMIN D3) 2000 units capsule;  Take 1 capsule by mouth daily            Treatment Recommendations- Risks Benefits      Immediate Medical/Psychiatric/Psychotherapy Treatments and Any Precautions: add clomipramine trial 25 mg po qhs   Risks, Benefits And Possible Side Effects Of Medications:  {PSYCH RISK, BENEFITS AND POSSIBLE SIDE EFFECTS (Optional):63045    Controlled Medication Discussion: Discussed with patient Black Box warning on concurrent use of benzodiazepines and opioid medications including sedation, respiratory depression, coma and death  Patient understands the risk of treatment with benzodiazepines in addition to opioids and wants to continue taking those medications

## 2018-08-13 ENCOUNTER — TELEPHONE (OUTPATIENT)
Dept: PSYCHIATRY | Facility: CLINIC | Age: 43
End: 2018-08-13

## 2018-08-13 NOTE — TELEPHONE ENCOUNTER
Notified via [facility] prior authorization for chlomipramine generated for [place]  The form is asking if the patient has OCD  Diagnosis/ICD 10 code is F44 4 - Dr Katia Avitia does this code qualify?

## 2018-08-13 NOTE — TELEPHONE ENCOUNTER
I spoke with representative from [facility] and gave additional information/updated diagnosis code  Request denied  Previous med trials include sertraline (current medication) and paroxetine, but no documented third medication tried of the following:  Citalopram, escitalopram or fluvoxamine  I spoke with Alice Ray and reviewed information  She was not sure about other med trials  I suggested she might contact her previous provider for more specific medications tried and she has my number to call back  An appeal could take up to 30 days for a decision (as per information included with denial letter)  For Dr Nadene Curling to review

## 2018-08-13 NOTE — TELEPHONE ENCOUNTER
P A request completed and [facility] with diagnosis F33 2 and F41 1 - unable to change initial diagnosis once first part of questionnaire submitted

## 2018-08-16 NOTE — TELEPHONE ENCOUNTER
Left message for patient to call the office to discuss plan regarding medication regimen due to insurance denial of Clomipramine

## 2018-08-17 NOTE — TELEPHONE ENCOUNTER
Gloria Carlson has not returned call  Possible remaining alternatives that she could try are:  Citalopram, escitalopram, fluvoxamine or fluoxetune

## 2018-09-06 ENCOUNTER — DOCUMENTATION (OUTPATIENT)
Dept: PSYCHIATRY | Facility: CLINIC | Age: 43
End: 2018-09-06

## 2018-09-06 NOTE — PROGRESS NOTES
Nursing followed up regarding medication choices suggested by Dr Suresh Mon  Patient reported that she has been on Lexapro and Prozac in the past  She would consider Celexa or Luvox for her OCD symptoms but is concerned about about being on 2 SSRI's, since she is already on Zoloft and was questioning if this was ok  She also wanted to make sure Dr Suresh Mon was aware that she is supersensitive with medications causing lethargy  Patient states overall she is doing "fine" but would still like to consider something for the "OCD symptoms"  For Dr Suresh Mon review

## 2018-09-10 DIAGNOSIS — F42.8 OTHER OBSESSIVE-COMPULSIVE DISORDERS: Primary | ICD-10-CM

## 2018-09-10 RX ORDER — FLUVOXAMINE MALEATE 25 MG
25 TABLET ORAL
Qty: 30 TABLET | Refills: 2 | Status: SHIPPED | OUTPATIENT
Start: 2018-09-10 | End: 2018-11-21 | Stop reason: SDUPTHER

## 2018-09-13 NOTE — PROGRESS NOTES
Spoke to patient and informed her that Dr Virginie Henley did send script for Luvox to her pharmacy  Discussed that it was a low dose to be taken at night  Patient had no questions

## 2018-11-20 DIAGNOSIS — F33.2 MDD (MAJOR DEPRESSIVE DISORDER), RECURRENT SEVERE, WITHOUT PSYCHOSIS (HCC): ICD-10-CM

## 2018-11-20 DIAGNOSIS — F42.8 OTHER OBSESSIVE-COMPULSIVE DISORDERS: ICD-10-CM

## 2018-11-20 RX ORDER — SERTRALINE HYDROCHLORIDE 100 MG/1
TABLET, FILM COATED ORAL
Qty: 90 TABLET | Refills: 0 | Status: SHIPPED | OUTPATIENT
Start: 2018-11-20 | End: 2019-01-11 | Stop reason: SDUPTHER

## 2018-11-21 RX ORDER — FLUVOXAMINE MALEATE 25 MG
25 TABLET ORAL
Qty: 90 TABLET | Refills: 0 | Status: SHIPPED | OUTPATIENT
Start: 2018-11-21 | End: 2019-04-23 | Stop reason: SDUPTHER

## 2018-11-21 NOTE — TELEPHONE ENCOUNTER
Received a faxed request from [facility] for a 90 day supply of fluvoxamine 25 mg  Will ask covering provider to review

## 2019-01-09 ENCOUNTER — DOCUMENTATION (OUTPATIENT)
Dept: PSYCHIATRY | Facility: CLINIC | Age: 44
End: 2019-01-09

## 2019-01-11 ENCOUNTER — OFFICE VISIT (OUTPATIENT)
Dept: PSYCHIATRY | Facility: CLINIC | Age: 44
End: 2019-01-11
Payer: COMMERCIAL

## 2019-01-11 DIAGNOSIS — F33.2 MDD (MAJOR DEPRESSIVE DISORDER), RECURRENT SEVERE, WITHOUT PSYCHOSIS (HCC): ICD-10-CM

## 2019-01-11 DIAGNOSIS — F42.2 MIXED OBSESSIONAL THOUGHTS AND ACTS: Primary | ICD-10-CM

## 2019-01-11 DIAGNOSIS — F90.2 ATTENTION DEFICIT HYPERACTIVITY DISORDER (ADHD), COMBINED TYPE: ICD-10-CM

## 2019-01-11 PROCEDURE — 99213 OFFICE O/P EST LOW 20 MIN: CPT | Performed by: PSYCHIATRY & NEUROLOGY

## 2019-01-11 RX ORDER — INFLUENZA A VIRUS A/SINGAPORE/GP1908/2015 IVR-180A (H1N1) ANTIGEN (PROPIOLACTONE INACTIVATED), INFLUENZA A VIRUS A/HONG KONG/4801/2014 X-263B (H3N2) ANTIGEN (PROPIOLACTONE INACTIVATED), INFLUENZA B VIRUS B/BRISBANE/46/2015 ANTIGEN (PROPIOLACTONE INACTIVATED), AND INFLUENZA B VIRUS B/PHUKET/3073/2013 BVR-1B ANTIGEN (PROPIOLACTONE INACTIVATED) 15; 15; 15; 15 UG/.5ML; UG/.5ML; UG/.5ML; UG/.5ML
INJECTION, SUSPENSION INTRAMUSCULAR
COMMUNITY
Start: 2018-11-02

## 2019-01-11 RX ORDER — SERTRALINE HYDROCHLORIDE 100 MG/1
100 TABLET, FILM COATED ORAL DAILY
Qty: 90 TABLET | Refills: 0 | Status: SHIPPED | OUTPATIENT
Start: 2019-01-11 | End: 2019-04-23 | Stop reason: SDUPTHER

## 2019-01-11 NOTE — PSYCH
Subjective: Medication Management      Patient ID: Rashmi White is a 39 y o  female  HPI ROS Appetite Changes and Sleep: normal appetite, decreased energy, no weight change and normal number of sleep hours   Patient was Rx Clomipramine but insurance will not cover and was switched to Fluvoxamine but she did not started the medication because she is concern about feeling fatigued and tired and also she stated her OCD symptoms are stable now  Review Of Systems:     Mood Anxiety, Depression and Emotional Lability   Behavior Compulsive Behavior   Thought Content Disturbing Thoughts, Feelings and Unreasonalbe or Irrational Fears   General Emotional Problems and Decreased Functioning   Personality Normal   Other Psych Symptoms Normal   Constitutional Negative   ENT Negative   Cardiovascular Negative   Respiratory Negative   Gastrointestinal Negative   Genitourinary Negative   Musculoskeletal Negative   Integumentary Negative   Neurological Negative   Endocrine Normal    Other Symptoms Normal              Laboratory Results: No results found for this or any previous visit  Substance Abuse History:  History   Drug use: Unknown       Family Psychiatric History: No family history on file  The following portions of the patient's history were reviewed and updated as appropriate: allergies, current medications, past family history, past medical history, past social history, past surgical history and problem list     Social History     Social History    Marital status: /Civil Union     Spouse name: N/A    Number of children: N/A    Years of education: N/A     Occupational History    Not on file       Social History Main Topics    Smoking status: Not on file    Smokeless tobacco: Not on file    Alcohol use Not on file    Drug use: Unknown    Sexual activity: Not on file     Other Topics Concern    Not on file     Social History Narrative    No narrative on file     Social History     Social History Narrative    No narrative on file       Objective:       Mental status:  Appearance calm and cooperative , adequate hygiene and grooming and good eye contact    Mood dysphoric   Affect affect was constricted   Speech a normal rate and fluent   Thought Processes coherent/organized and normal thought processes   Hallucinations no hallucinations present    Thought Content no delusions   Abnormal Thoughts no suicidal thoughts  and no homicidal thoughts    Orientation  oriented to person and place and time   Remote Memory short term memory intact and long term memory intact   Attention Span concentration impaired   Intellect Appears to be of Average Intelligence   Insight Limited insight   Judgement judgment was limited   Muscle Strength Muscle strength and tone were normal and Normal gait    Language no difficulty naming common objects, no difficulty repeating a phrase  and no difficulty writing a sentence    Fund of Knowledge displays adequate knowledge of current events, adequate fund of knowledge regarding past history and adequate fund of knowledge regarding vocabulary    Pain none   Pain Scale 0       Assessment/Plan:       Diagnoses and all orders for this visit:    Mixed obsessional thoughts and acts    MDD (major depressive disorder), recurrent severe, without psychosis ([facility]CC)  -     sertraline (ZOLOFT) 100 mg tablet; Take 1 tablet (100 mg total) by mouth daily    Attention deficit hyperactivity disorder (ADHD), combined type    Other orders  -     AFLURIA QUADRIVALENT 0 5 ML ALEXA;              Treatment Recommendations- Risks Benefits      Immediate Medical/Psychiatric/Psychotherapy Treatments and Any Precautions: continue Sertraline 100 mg     Risks, Benefits And Possible Side Effects Of Medications:  {PSYCH RISK, BENEFITS AND POSSIBLE SIDE EFFECTS (Optional):76564

## 2019-04-23 ENCOUNTER — OFFICE VISIT (OUTPATIENT)
Dept: PSYCHIATRY | Facility: CLINIC | Age: 44
End: 2019-04-23
Payer: COMMERCIAL

## 2019-04-23 DIAGNOSIS — F33.2 MDD (MAJOR DEPRESSIVE DISORDER), RECURRENT SEVERE, WITHOUT PSYCHOSIS (HCC): ICD-10-CM

## 2019-04-23 DIAGNOSIS — F42.8 OTHER OBSESSIVE-COMPULSIVE DISORDERS: ICD-10-CM

## 2019-04-23 RX ORDER — FLUVOXAMINE MALEATE 25 MG
25 TABLET ORAL
Qty: 90 TABLET | Refills: 0 | Status: SHIPPED | OUTPATIENT
Start: 2019-04-23 | End: 2019-06-04 | Stop reason: ALTCHOICE

## 2019-04-23 RX ORDER — FLUVOXAMINE MALEATE 25 MG
25 TABLET ORAL
Qty: 90 TABLET | Refills: 0 | Status: CANCELLED | OUTPATIENT
Start: 2019-04-23

## 2019-04-23 RX ORDER — SERTRALINE HYDROCHLORIDE 100 MG/1
100 TABLET, FILM COATED ORAL DAILY
Qty: 90 TABLET | Refills: 0 | Status: SHIPPED | OUTPATIENT
Start: 2019-04-23 | End: 2019-06-04 | Stop reason: SDUPTHER

## 2019-04-23 RX ORDER — SERTRALINE HYDROCHLORIDE 100 MG/1
100 TABLET, FILM COATED ORAL DAILY
Qty: 90 TABLET | Refills: 0 | Status: CANCELLED | OUTPATIENT
Start: 2019-04-23

## 2019-06-04 ENCOUNTER — OFFICE VISIT (OUTPATIENT)
Dept: PSYCHIATRY | Facility: CLINIC | Age: 44
End: 2019-06-04
Payer: COMMERCIAL

## 2019-06-04 DIAGNOSIS — F33.2 MDD (MAJOR DEPRESSIVE DISORDER), RECURRENT SEVERE, WITHOUT PSYCHOSIS (HCC): Primary | ICD-10-CM

## 2019-06-04 DIAGNOSIS — F42.2 MIXED OBSESSIONAL THOUGHTS AND ACTS: ICD-10-CM

## 2019-06-04 DIAGNOSIS — F41.1 GAD (GENERALIZED ANXIETY DISORDER): ICD-10-CM

## 2019-06-04 DIAGNOSIS — F41.1 GENERALIZED ANXIETY DISORDER: ICD-10-CM

## 2019-06-04 PROCEDURE — 99213 OFFICE O/P EST LOW 20 MIN: CPT | Performed by: PSYCHIATRY & NEUROLOGY

## 2019-06-04 RX ORDER — SERTRALINE HYDROCHLORIDE 100 MG/1
100 TABLET, FILM COATED ORAL DAILY
Qty: 90 TABLET | Refills: 0 | Status: SHIPPED | OUTPATIENT
Start: 2019-06-04 | End: 2019-12-08 | Stop reason: SDUPTHER

## 2019-06-04 RX ORDER — BUSPIRONE HYDROCHLORIDE 7.5 MG/1
7.5 TABLET ORAL 3 TIMES DAILY
Qty: 60 TABLET | Refills: 2 | Status: SHIPPED | OUTPATIENT
Start: 2019-06-04 | End: 2020-01-14 | Stop reason: SDUPTHER

## 2019-06-07 ENCOUNTER — TRANSCRIBE ORDERS (OUTPATIENT)
Dept: ADMINISTRATIVE | Facility: HOSPITAL | Age: 44
End: 2019-06-07

## 2019-06-07 DIAGNOSIS — Z12.39 BREAST SCREENING: ICD-10-CM

## 2019-06-07 DIAGNOSIS — Z12.39 BREAST SCREENING, UNSPECIFIED: Primary | ICD-10-CM

## 2019-07-30 ENCOUNTER — DOCUMENTATION (OUTPATIENT)
Dept: PSYCHIATRY | Facility: CLINIC | Age: 44
End: 2019-07-30

## 2019-10-17 ENCOUNTER — APPOINTMENT (OUTPATIENT)
Dept: LAB | Age: 44
End: 2019-10-17
Payer: COMMERCIAL

## 2019-10-17 ENCOUNTER — TRANSCRIBE ORDERS (OUTPATIENT)
Dept: ADMINISTRATIVE | Age: 44
End: 2019-10-17

## 2019-10-17 ENCOUNTER — HOSPITAL ENCOUNTER (OUTPATIENT)
Dept: RADIOLOGY | Age: 44
Discharge: HOME | End: 2019-10-17
Payer: COMMERCIAL

## 2019-10-17 VITALS — HEIGHT: 61 IN | WEIGHT: 110 LBS | BODY MASS INDEX: 20.77 KG/M2

## 2019-10-17 DIAGNOSIS — F33.2 MDD (MAJOR DEPRESSIVE DISORDER), RECURRENT SEVERE, WITHOUT PSYCHOSIS (HCC): ICD-10-CM

## 2019-10-17 DIAGNOSIS — F33.2 SEVERE RECURRENT MAJOR DEPRESSION WITHOUT PSYCHOTIC FEATURES (HCC): Primary | ICD-10-CM

## 2019-10-17 DIAGNOSIS — F33.2 SEVERE RECURRENT MAJOR DEPRESSION WITHOUT PSYCHOTIC FEATURES (HCC): ICD-10-CM

## 2019-10-17 DIAGNOSIS — Z12.39 BREAST SCREENING: ICD-10-CM

## 2019-10-17 LAB
FSH SERPL-ACNC: 3.9 MIU/ML
TSH SERPL DL<=0.05 MIU/L-ACNC: 2.08 UIU/ML (ref 0.36–3.74)

## 2019-10-17 PROCEDURE — 77063 BREAST TOMOSYNTHESIS BI: CPT

## 2019-10-17 PROCEDURE — 83001 ASSAY OF GONADOTROPIN (FSH): CPT

## 2019-10-17 PROCEDURE — 77067 SCR MAMMO BI INCL CAD: CPT

## 2019-10-17 PROCEDURE — 84443 ASSAY THYROID STIM HORMONE: CPT

## 2019-10-17 PROCEDURE — 36415 COLL VENOUS BLD VENIPUNCTURE: CPT

## 2019-12-08 DIAGNOSIS — F33.2 MDD (MAJOR DEPRESSIVE DISORDER), RECURRENT SEVERE, WITHOUT PSYCHOSIS (HCC): ICD-10-CM

## 2019-12-09 RX ORDER — SERTRALINE HYDROCHLORIDE 100 MG/1
TABLET, FILM COATED ORAL
Qty: 90 TABLET | Refills: 0 | Status: SHIPPED | OUTPATIENT
Start: 2019-12-09 | End: 2020-01-14 | Stop reason: SDUPTHER

## 2020-01-14 DIAGNOSIS — F41.1 GAD (GENERALIZED ANXIETY DISORDER): ICD-10-CM

## 2020-01-14 DIAGNOSIS — F33.2 MDD (MAJOR DEPRESSIVE DISORDER), RECURRENT SEVERE, WITHOUT PSYCHOSIS (HCC): ICD-10-CM

## 2020-01-14 RX ORDER — SERTRALINE HYDROCHLORIDE 100 MG/1
150 TABLET, FILM COATED ORAL DAILY
Qty: 135 TABLET | Refills: 0 | Status: SHIPPED | OUTPATIENT
Start: 2020-01-14 | End: 2020-03-12 | Stop reason: SDUPTHER

## 2020-01-14 RX ORDER — BUSPIRONE HYDROCHLORIDE 7.5 MG/1
7.5 TABLET ORAL 2 TIMES DAILY
Qty: 180 TABLET | Refills: 0 | Status: SHIPPED | OUTPATIENT
Start: 2020-01-14 | End: 2020-03-12 | Stop reason: ALTCHOICE

## 2020-01-14 NOTE — TELEPHONE ENCOUNTER
Dyan Malcolm called and canceled her apt for today 1/14/2020 at 9:20 am and she is asking if you can up her dosage of (ZOLOFT) from 100mg to 150mg  Can you give her a call and discuss this with her she said the (Katlyn Luis is supposed to be 2 times a day not 3 times a day  Thank you

## 2020-03-11 ENCOUNTER — DOCUMENTATION (OUTPATIENT)
Dept: PSYCHIATRY | Facility: CLINIC | Age: 45
End: 2020-03-11

## 2020-03-11 NOTE — PROGRESS NOTES
Treatment Plan not completed within required time limits due to:  Follow Up  from last Treatment Plan schedule over 180 days from OV

## 2020-03-12 ENCOUNTER — OFFICE VISIT (OUTPATIENT)
Dept: PSYCHIATRY | Facility: CLINIC | Age: 45
End: 2020-03-12
Payer: COMMERCIAL

## 2020-03-12 DIAGNOSIS — F33.2 MDD (MAJOR DEPRESSIVE DISORDER), RECURRENT SEVERE, WITHOUT PSYCHOSIS (HCC): Primary | ICD-10-CM

## 2020-03-12 DIAGNOSIS — F41.1 GENERALIZED ANXIETY DISORDER: ICD-10-CM

## 2020-03-12 PROCEDURE — 99213 OFFICE O/P EST LOW 20 MIN: CPT | Performed by: PSYCHIATRY & NEUROLOGY

## 2020-03-12 RX ORDER — SERTRALINE HYDROCHLORIDE 100 MG/1
100 TABLET, FILM COATED ORAL DAILY
Qty: 90 TABLET | Refills: 0 | Status: SHIPPED | OUTPATIENT
Start: 2020-03-12 | End: 2020-06-12 | Stop reason: SDUPTHER

## 2020-03-12 RX ORDER — SERTRALINE HYDROCHLORIDE 100 MG/1
150 TABLET, FILM COATED ORAL DAILY
Qty: 135 TABLET | Refills: 0 | Status: SHIPPED | OUTPATIENT
Start: 2020-03-12 | End: 2020-03-12 | Stop reason: SDUPTHER

## 2020-03-12 NOTE — PSYCH
Subjective: Medication Management      Patient ID: Bryn Wade is a 54 y o  female  HPI ROS Appetite Changes and Sleep: normal appetite, normal energy level, no weight change and normal number of sleep hours     Patient stated since last seen she got her TSH and FSH tested and WNL  She tried Buspirone for a while but stated she didn't feel it helped her and it was making her tired  She decided to stop Buspirone and she tried increasing her sertraline to 150 mg daily  She stated her anxiety has been gradually improving but she still has trouble with attention and concentration and finishing tasks  Denies medication side effects  No recent health changes or new medications  Continue current treatment  Will consider trial of Strattera for attention and concentration  Review Of Systems:     Mood Anxiety, Depression and Emotional Lability   Behavior Compulsive Behavior and Impulsive Behavior   Thought Content Disturbing Thoughts, Feelings   General Emotional Problems and Decreased Functioning   Personality Normal   Other Psych Symptoms Normal   Constitutional Negative   ENT Negative   Cardiovascular Negative   Respiratory Negative   Gastrointestinal Negative   Genitourinary Negative   Musculoskeletal Negative   Integumentary Negative   Neurological Negative   Endocrine Normal    Other Symptoms Normal              Laboratory Results: No results found for this or any previous visit      Substance Abuse History:  Social History     Substance and Sexual Activity   Drug Use Not on file       Family Psychiatric History:   Family History   Problem Relation Age of Onset    No Known Problems Mother     Brain cancer Father 67    No Known Problems Sister     No Known Problems Daughter     No Known Problems Maternal Grandmother     No Known Problems Maternal Grandfather     No Known Problems Paternal Grandmother     Colon cancer Paternal Grandfather 46    No Known Problems Sister     No Known Problems Maternal Aunt        The following portions of the patient's history were reviewed and updated as appropriate: allergies, current medications, past family history, past medical history, past social history, past surgical history and problem list     Social History     Socioeconomic History    Marital status: /Civil Union     Spouse name: Not on file    Number of children: Not on file    Years of education: Not on file    Highest education level: Not on file   Occupational History    Not on file   Social Needs    Financial resource strain: Not on file    Food insecurity:     Worry: Not on file     Inability: Not on file    Transportation needs:     Medical: Not on file     Non-medical: Not on file   Tobacco Use    Smoking status: Not on file   Substance and Sexual Activity    Alcohol use: Not on file    Drug use: Not on file    Sexual activity: Not on file   Lifestyle    Physical activity:     Days per week: Not on file     Minutes per session: Not on file    Stress: Not on file   Relationships    Social connections:     Talks on phone: Not on file     Gets together: Not on file     Attends Rastafarian service: Not on file     Active member of club or organization: Not on file     Attends meetings of clubs or organizations: Not on file     Relationship status: Not on file    Intimate partner violence:     Fear of current or ex partner: Not on file     Emotionally abused: Not on file     Physically abused: Not on file     Forced sexual activity: Not on file   Other Topics Concern    Not on file   Social History Narrative    Not on file     Social History     Social History Narrative    Not on file       Objective:       Mental status:  Appearance calm and cooperative , adequate hygiene and grooming and good eye contact    Mood dysphoric   Affect affect was constricted   Speech a normal rate and fluent   Thought Processes coherent/organized and normal thought processes   Hallucinations no hallucinations present    Thought Content no delusions   Abnormal Thoughts no suicidal thoughts  and no homicidal thoughts    Orientation  oriented to person and place and time   Remote Memory short term memory intact and long term memory intact   Attention Span concentration intact   Intellect Appears to be of Average Intelligence   Insight Limited insight   Judgement judgment was limited   Muscle Strength Muscle strength and tone were normal and Normal gait    Language no difficulty naming common objects, no difficulty repeating a phrase  and no difficulty writing a sentence    Fund of Knowledge displays adequate knowledge of current events, adequate fund of knowledge regarding past history and adequate fund of knowledge regarding vocabulary    Pain none   Pain Scale 0       Assessment/Plan:       Diagnoses and all orders for this visit:    MDD (major depressive disorder), recurrent severe, without psychosis ([facility] [address] )  -     sertraline (ZOLOFT) 100 mg tablet; Take 1 tablet (100 mg total) by mouth daily  -     sertraline (ZOLOFT) 50 mg tablet; Take 1 tablet (50 mg total) by mouth daily    Generalized anxiety disorder  -     sertraline (ZOLOFT) 50 mg tablet; Take 1 tablet (50 mg total) by mouth daily            Treatment Recommendations- Risks Benefits      Immediate Medical/Psychiatric/Psychotherapy Treatments and Any Precautions: continue current treatment     Risks, Benefits And Possible Side Effects Of Medications:  {PSYCH RISK, BENEFITS AND POSSIBLE SIDE EFFECTS (Optional):81111    Controlled Medication Discussion: Discussed with patient Black Box warning on concurrent use of benzodiazepines and opioid medications including sedation, respiratory depression, coma and death  Patient understands the risk of treatment with benzodiazepines in addition to opioids and wants to continue taking those medications   , Discussed with patient the risks of sedation, respiratory depression, impairment of ability to drive and potential for abuse and addiction related to treatment with benzodiazepine medications  The patient understands risk of treatment with benzodiazepine medications, agrees to not drive if feels impaired and agrees to take medications as prescribed   and The patient has been filling controlled prescriptions on time as prescribed to [facility]

## 2020-03-12 NOTE — BH TREATMENT PLAN
TREATMENT PLAN (Medication Management Only)        [facility]    Name and Date of Birth:  Ryne Camejo 54 y o  1975  Date of Treatment Plan: March 12, 2020  Diagnosis/Diagnoses:    1  MDD (major depressive disorder), recurrent severe, without psychosis ([facility] [address] )    2  Generalized anxiety disorder      Strengths/Personal Resources for Self-Care: taking medications as prescribed, ability to communicate needs, motivation for treatment  being outgoing, sense of humor, and strong harmony  Area/Areas of need (in own words): anxiety, anxiety symptoms, depression, depressive symptoms, OCD, and attention and concentrations problems  1  Long Term Goal: continue improvement in anxiety  Target Date: 2 months - 5/12/2020  Person/Persons responsible for completion of goal: Clara  2  Short Term Objective (s) - How will we reach this goal?:   A  Provider new recommended medication/dosage changes and/or continue medication(s): continue current medications as prescribed  B  N/A   C  N/A  Target Date: 3 months - 6/12/2020  Person/Persons Responsible for Completion of Goal: Clara  Progress Towards Goals: continuing treatment  Treatment Modality: medication management every 3 months  Review due 90 to 120 days from date of this plan: 3 months - 6/12/2020  Expected length of service: maintenance  My Physician/PA/NP and I have developed this plan together and I agree to work on the goals and objectives  I understand the treatment goals that were developed for my treatment

## 2020-06-12 ENCOUNTER — TELEMEDICINE (OUTPATIENT)
Dept: PSYCHIATRY | Facility: CLINIC | Age: 45
End: 2020-06-12
Payer: COMMERCIAL

## 2020-06-12 DIAGNOSIS — F41.1 GENERALIZED ANXIETY DISORDER: ICD-10-CM

## 2020-06-12 DIAGNOSIS — F90.2 ATTENTION DEFICIT HYPERACTIVITY DISORDER (ADHD), COMBINED TYPE: ICD-10-CM

## 2020-06-12 DIAGNOSIS — F33.2 MDD (MAJOR DEPRESSIVE DISORDER), RECURRENT SEVERE, WITHOUT PSYCHOSIS (HCC): ICD-10-CM

## 2020-06-12 DIAGNOSIS — F42.2 MIXED OBSESSIONAL THOUGHTS AND ACTS: Primary | ICD-10-CM

## 2020-06-12 PROCEDURE — 99214 OFFICE O/P EST MOD 30 MIN: CPT | Performed by: PSYCHIATRY & NEUROLOGY

## 2020-06-12 RX ORDER — SERTRALINE HYDROCHLORIDE 100 MG/1
100 TABLET, FILM COATED ORAL DAILY
Qty: 90 TABLET | Refills: 0 | Status: SHIPPED | OUTPATIENT
Start: 2020-06-12 | End: 2020-09-14

## 2020-09-12 DIAGNOSIS — F41.1 GENERALIZED ANXIETY DISORDER: ICD-10-CM

## 2020-09-12 DIAGNOSIS — F33.2 MDD (MAJOR DEPRESSIVE DISORDER), RECURRENT SEVERE, WITHOUT PSYCHOSIS (HCC): ICD-10-CM

## 2020-09-14 RX ORDER — SERTRALINE HYDROCHLORIDE 100 MG/1
TABLET, FILM COATED ORAL
Qty: 90 TABLET | Refills: 0 | Status: SHIPPED | OUTPATIENT
Start: 2020-09-14 | End: 2020-12-07

## 2020-12-07 ENCOUNTER — TELEMEDICINE (OUTPATIENT)
Dept: PSYCHIATRY | Facility: CLINIC | Age: 45
End: 2020-12-07
Payer: COMMERCIAL

## 2020-12-07 ENCOUNTER — TELEPHONE (OUTPATIENT)
Dept: PSYCHIATRY | Facility: CLINIC | Age: 45
End: 2020-12-07

## 2020-12-07 DIAGNOSIS — F33.2 MDD (MAJOR DEPRESSIVE DISORDER), RECURRENT SEVERE, WITHOUT PSYCHOSIS (HCC): Primary | ICD-10-CM

## 2020-12-07 DIAGNOSIS — F41.1 GENERALIZED ANXIETY DISORDER: ICD-10-CM

## 2020-12-07 DIAGNOSIS — F42.2 MIXED OBSESSIONAL THOUGHTS AND ACTS: ICD-10-CM

## 2020-12-07 PROCEDURE — 99214 OFFICE O/P EST MOD 30 MIN: CPT | Performed by: PSYCHIATRY & NEUROLOGY

## 2020-12-07 PROCEDURE — 90833 PSYTX W PT W E/M 30 MIN: CPT | Performed by: PSYCHIATRY & NEUROLOGY

## 2020-12-07 RX ORDER — SERTRALINE HYDROCHLORIDE 100 MG/1
100 TABLET, FILM COATED ORAL DAILY
Qty: 90 TABLET | Refills: 0 | Status: CANCELLED | OUTPATIENT
Start: 2020-12-07

## 2020-12-07 RX ORDER — ESCITALOPRAM OXALATE 20 MG/1
20 TABLET ORAL DAILY
Qty: 30 TABLET | Refills: 2 | Status: SHIPPED | OUTPATIENT
Start: 2020-12-07 | End: 2021-03-04

## 2021-03-04 DIAGNOSIS — F41.1 GENERALIZED ANXIETY DISORDER: ICD-10-CM

## 2021-03-04 DIAGNOSIS — F33.2 MDD (MAJOR DEPRESSIVE DISORDER), RECURRENT SEVERE, WITHOUT PSYCHOSIS (HCC): ICD-10-CM

## 2021-03-04 DIAGNOSIS — F42.2 MIXED OBSESSIONAL THOUGHTS AND ACTS: ICD-10-CM

## 2021-03-04 RX ORDER — ESCITALOPRAM OXALATE 20 MG/1
TABLET ORAL
Qty: 90 TABLET | Refills: 0 | Status: SHIPPED | OUTPATIENT
Start: 2021-03-04 | End: 2021-04-23

## 2021-03-17 ENCOUNTER — OFFICE VISIT (OUTPATIENT)
Dept: PSYCHIATRY | Facility: CLINIC | Age: 46
End: 2021-03-17
Payer: COMMERCIAL

## 2021-03-17 DIAGNOSIS — F33.2 MDD (MAJOR DEPRESSIVE DISORDER), RECURRENT SEVERE, WITHOUT PSYCHOSIS (HCC): Primary | ICD-10-CM

## 2021-03-17 DIAGNOSIS — F41.1 GENERALIZED ANXIETY DISORDER: ICD-10-CM

## 2021-03-17 DIAGNOSIS — F42.2 MIXED OBSESSIONAL THOUGHTS AND ACTS: ICD-10-CM

## 2021-03-17 PROCEDURE — 90833 PSYTX W PT W E/M 30 MIN: CPT | Performed by: PSYCHIATRY & NEUROLOGY

## 2021-03-17 PROCEDURE — 99213 OFFICE O/P EST LOW 20 MIN: CPT | Performed by: PSYCHIATRY & NEUROLOGY

## 2021-03-17 RX ORDER — ARIPIPRAZOLE 2 MG/1
2 TABLET ORAL DAILY
Qty: 30 TABLET | Refills: 2 | Status: SHIPPED | OUTPATIENT
Start: 2021-03-17 | End: 2021-07-10

## 2021-03-17 NOTE — PSYCH
Subjective: Medication Management      Patient ID: Aubrie Grant is a 51 y o  female  HPI ROS Appetite Changes and Sleep: normal appetite, normal energy level, no weight change and normal number of sleep hours   Patient remains compliant with medications and denies side effects  She denies recent health changes or new medications  Since last seen she was able to taper off Sertraline and start Lexapro initially 10 mg and then increased to 20 mg  She stated initially she felt tired but eventually these side effects went away  She feels her level of anxiety is overall improved but she still feels she has significant level of depression  She is not sure if she needs to switch medication again or maybe try a combination of medication for augmentation  She agrees to start Abilify 2 mg po qam and will schedule follow up in 4-6 weeks or sooner if needed  Review Of Systems:     Mood Anxiety and Depression   Behavior Impulsive Behavior   Thought Content Disturbing Thoughts, Feelings   General Emotional Problems and Decreased Functioning   Personality Normal   Other Psych Symptoms Normal   Constitutional Negative   ENT Negative   Cardiovascular Negative   Respiratory Negative   Gastrointestinal Negative   Genitourinary Negative   Musculoskeletal Negative   Integumentary Negative   Neurological Negative   Endocrine Normal    Other Symptoms Normal              Laboratory Results: No results found for this or any previous visit      Substance Abuse History:  Social History     Substance and Sexual Activity   Drug Use Not on file       Family Psychiatric History:   Family History   Problem Relation Age of Onset    No Known Problems Mother     Brain cancer Father 67    No Known Problems Sister     No Known Problems Daughter     No Known Problems Maternal Grandmother     No Known Problems Maternal Grandfather     No Known Problems Paternal Grandmother     Colon cancer Paternal Grandfather 46    No Known Problems Sister     No Known Problems Maternal Aunt        The following portions of the patient's history were reviewed and updated as appropriate: allergies, current medications, past family history, past medical history, past social history, past surgical history and problem list     Social History     Socioeconomic History    Marital status: /Civil Union     Spouse name: Not on file    Number of children: Not on file    Years of education: Not on file    Highest education level: Not on file   Occupational History    Not on file   Social Needs    Financial resource strain: Not on file    Food insecurity     Worry: Not on file     Inability: Not on file    Transportation needs     Medical: Not on file     Non-medical: Not on file   Tobacco Use    Smoking status: Not on file   Substance and Sexual Activity    Alcohol use: Not on file    Drug use: Not on file    Sexual activity: Not on file   Lifestyle    Physical activity     Days per week: Not on file     Minutes per session: Not on file    Stress: Not on file   Relationships    Social connections     Talks on phone: Not on file     Gets together: Not on file     Attends Latter day service: Not on file     Active member of club or organization: Not on file     Attends meetings of clubs or organizations: Not on file     Relationship status: Not on file    Intimate partner violence     Fear of current or ex partner: Not on file     Emotionally abused: Not on file     Physically abused: Not on file     Forced sexual activity: Not on file   Other Topics Concern    Not on file   Social History Narrative    Not on file     Social History     Social History Narrative    Not on file       Objective:       Mental status:  Appearance calm and cooperative , adequate hygiene and grooming and good eye contact    Mood dysphoric   Affect affect was constricted   Speech a normal rate and fluent   Thought Processes coherent/organized and normal thought processes   Hallucinations no hallucinations present    Thought Content no delusions   Abnormal Thoughts no suicidal thoughts  and no homicidal thoughts    Orientation  oriented to person and place and time   Remote Memory short term memory intact and long term memory intact   Attention Span concentration intact   Intellect Appears to be of Average Intelligence   Insight Limited insight   Judgement judgment was limited   Muscle Strength Muscle strength and tone were normal and Normal gait    Language no difficulty naming common objects and no difficulty repeating a phrase    Fund of Knowledge displays adequate knowledge of current events     Assessment/Plan:       Diagnoses and all orders for this visit:    MDD (major depressive disorder), recurrent severe, without [facility]  -     ARIPiprazole (ABILIFY) 2 mg tablet; Take 1 tablet (2 mg total) by mouth daily    Mixed obsessional thoughts and acts    Generalized anxiety disorder            Treatment Recommendations- Risks Benefits      Immediate Medical/Psychiatric/Psychotherapy Treatments and Any Precautions: continue current treatment     Risks, Benefits And Possible Side Effects Of Medications:  {PSYCH RISK, BENEFITS AND POSSIBLE SIDE EFFECTS (Optional):88077    Controlled Medication Discussion: Discussed with patient Black Box warning on concurrent use of benzodiazepines and opioid medications including sedation, respiratory depression, coma and death  Patient understands the risk of treatment with benzodiazepines in addition to opioids and wants to continue taking those medications  , Discussed with patient the risks of sedation, respiratory depression, impairment of ability to drive and potential for abuse and addiction related to treatment with benzodiazepine medications  The patient understands risk of treatment with benzodiazepine medications, agrees to not drive if feels impaired and agrees to take medications as prescribed   and The patient has been filling controlled prescriptions on time as prescribed to [facility]       Psychotherapy Provided:     Individual psychotherapy provided: Yes  Counseling was provided during the session today for 16 minutes  Medications, treatment progress and treatment plan reviewed with Adam Blanton  Medication education provided to Adam Blanton  Goals discussed during in session: improve control of depression  Coping strategies including compliance with medications, deep/slow breathing, eliminating avoidance, getting into a good routine, increasing interest in usual activities, increasing motivation, keeping busy at home, maintain healthy diet, maintain heathy sleeping hygiene and maintain positive attitude reviewed with Adam Blanton  Importance of medication and treatment compliance reviewed with Clara Heredia on importance of medication and treatment compliance  Discussed with Adam Blanton acceptance of mental illness diagnosis and need for ongoing psychiatric treatment  Supportive therapy provided

## 2021-04-19 ENCOUNTER — TRANSCRIBE ORDERS (OUTPATIENT)
Dept: ADMINISTRATIVE | Facility: HOSPITAL | Age: 46
End: 2021-04-19

## 2021-04-19 DIAGNOSIS — R31.0 GROSS HEMATURIA: Primary | ICD-10-CM

## 2021-04-23 ENCOUNTER — TELEMEDICINE (OUTPATIENT)
Dept: PSYCHIATRY | Facility: CLINIC | Age: 46
End: 2021-04-23
Payer: COMMERCIAL

## 2021-04-23 DIAGNOSIS — F41.1 GENERALIZED ANXIETY DISORDER: ICD-10-CM

## 2021-04-23 DIAGNOSIS — F42.2 MIXED OBSESSIONAL THOUGHTS AND ACTS: ICD-10-CM

## 2021-04-23 DIAGNOSIS — F33.2 MDD (MAJOR DEPRESSIVE DISORDER), RECURRENT SEVERE, WITHOUT PSYCHOSIS (HCC): Primary | ICD-10-CM

## 2021-04-23 PROCEDURE — 99214 OFFICE O/P EST MOD 30 MIN: CPT | Performed by: PSYCHIATRY & NEUROLOGY

## 2021-04-23 RX ORDER — ESCITALOPRAM OXALATE 5 MG/1
TABLET ORAL
Qty: 7 TABLET | Refills: 0 | Status: SHIPPED | OUTPATIENT
Start: 2021-04-23 | End: 2021-09-24

## 2021-04-23 RX ORDER — ESCITALOPRAM OXALATE 10 MG/1
10 TABLET ORAL DAILY
Qty: 30 TABLET | Refills: 2 | Status: SHIPPED | OUTPATIENT
Start: 2021-04-23 | End: 2021-07-12

## 2021-04-23 NOTE — PSYCH
Virtual Regular Visit      Assessment/Plan:    Problem List Items Addressed This Visit        Other    Generalized anxiety disorder    Relevant Medications    escitalopram (LEXAPRO) 10 mg tablet    escitalopram (LEXAPRO) 5 mg tablet    MDD (major depressive disorder), recurrent severe, without psychosis ([facility] [address] ) - Primary    Relevant Medications    escitalopram (LEXAPRO) 10 mg tablet    escitalopram (LEXAPRO) 5 mg tablet    Mixed obsessional thoughts and acts    Relevant Medications    escitalopram (LEXAPRO) 10 mg tablet    escitalopram (LEXAPRO) 5 mg tablet                 Reason for visit is   Chief Complaint   Patient presents with    Virtual Regular Visit        Encounter provider Dee Arteaga MD    Provider located at [address] [place] [id number] [phone]      Recent Visits  No visits were found meeting these conditions  Showing recent visits within past 7 days and meeting all other requirements     Today's Visits  Date Type Provider Dept   04/23/21 Telemedicine Dee Arteaga MD [facility] today's visits and meeting all other requirements     Future Appointments  No visits were found meeting these conditions  Showing future appointments within next 150 days and meeting all other requirements        The patient was identified by name and date of birth  Vanessa Cons was informed that this is a telemedicine visit and that the visit is being conducted through [employer] and patient was informed that this is a secure, HIPAA-compliant platform  She agrees to proceed     My office door was closed  No one else was in the room  She acknowledged consent and understanding of privacy and security of the video platform  The patient has agreed to participate and understands they can discontinue the visit at any time  Patient is aware this is a billable service  Ivelisse Hidalgo is a 51 y o  female with MDD,OCD,MURTAZA  Since last seen patient stated that she feels that Lexapro has not been helping and she has even though she may be on too high of a dose of Lexapro because she is having racing thoughts and worse anxiety on 20 mg  She will like to try lowering dose to 10 mg  She will like gradually come down to 15 mg for a week and then 10 mg and stay on 10 mg and monitor her response  She stated she did not try adding Abilify because she wanted to make sure she adjusted the Lexapro dose first before adding another medication  If Lexapro 10 mg works she might not need to add Abilify at all  Agrees to try above changes and schedule follow up in 4-6 weeks or sooner if needed  HPI     History reviewed  No pertinent past medical history  History reviewed  No pertinent surgical history  Current Outpatient Medications   Medication Sig Dispense Refill    AFLURIA QUADRIVALENT 0 5 ML ALEXA       ARIPiprazole (ABILIFY) 2 mg tablet Take 1 tablet (2 mg total) by mouth daily 30 tablet 2    Cholecalciferol (VITAMIN D3) 2000 units capsule Take 1 capsule by mouth daily      escitalopram (LEXAPRO) 10 mg tablet Take 1 tablet (10 mg total) by mouth daily 30 tablet 2    escitalopram (LEXAPRO) 5 mg tablet Take with 10 mg for total 15 mg to taper down to 15 mg for 1 week 7 tablet 0     No current facility-administered medications for this visit           No Known Allergies    Review of Systems       Mood Anxiety and Depression   Behavior Compulsive Behavior and Impulsive Behavior   Thought Content Disturbing Thoughts, Feelings   General Emotional Problems and Decreased Functioning   Personality Normal   Other Psych Symptoms Normal   Constitutional Negative   ENT Negative   Cardiovascular Negative   Respiratory Negative   Gastrointestinal Negative   Genitourinary Negative   Musculoskeletal Negative   Integumentary Negative   Neurological Negative   Endocrine Normal Other Symptoms Normal              Laboratory Results: No results found for this or any previous visit      Substance Abuse History:  Social History     Substance and Sexual Activity   Drug Use Not on file       Family Psychiatric History:   Family History   Problem Relation Age of Onset    No Known Problems Mother     Brain cancer Father 67    No Known Problems Sister     No Known Problems Daughter     No Known Problems Maternal Grandmother     No Known Problems Maternal Grandfather     No Known Problems Paternal Grandmother     Colon cancer Paternal Grandfather 46    No Known Problems Sister     No Known Problems Maternal Aunt        The following portions of the patient's history were reviewed and updated as appropriate: allergies, current medications, past family history, past medical history, past social history, past surgical history and problem list     Social History     Socioeconomic History    Marital status: /Civil Union     Spouse name: Not on file    Number of children: Not on file    Years of education: Not on file    Highest education level: Not on file   Occupational History    Not on file   Social Needs    Financial resource strain: Not on file    Food insecurity     Worry: Not on file     Inability: Not on file    Transportation needs     Medical: Not on file     Non-medical: Not on file   Tobacco Use    Smoking status: Not on file   Substance and Sexual Activity    Alcohol use: Not on file    Drug use: Not on file    Sexual activity: Not on file   Lifestyle    Physical activity     Days per week: Not on file     Minutes per session: Not on file    Stress: Not on file   Relationships    Social connections     Talks on phone: Not on file     Gets together: Not on file     Attends [employer] service: Not on file     Active member of club or organization: Not on file     Attends meetings of clubs or organizations: Not on file     Relationship status: Not on file   Marilou Alas Intimate partner violence     Fear of current or ex partner: Not on file     Emotionally abused: Not on file     Physically abused: Not on file     Forced sexual activity: Not on file   Other Topics Concern    Not on file   Social History Narrative    Not on file     Social History     Social History Narrative    Not on file       Objective:       Mental status:  Appearance calm and cooperative , adequate hygiene and grooming and good eye contact    Mood dysphoric, depressed and anxious   Affect affect was constricted   Speech a normal rate and fluent   Thought Processes coherent/organized and normal thought processes   Hallucinations no hallucinations present    Thought Content no delusions   Abnormal Thoughts no suicidal thoughts  and no homicidal thoughts    Orientation  oriented to person and place and time   Remote Memory short term memory intact and long term memory intact   Attention Span concentration impaired   Intellect Appears to be of Average Intelligence   Insight Limited insight   Judgement judgment was limited   Muscle Strength n/a   Language no difficulty naming common objects and no difficulty repeating a phrase    Fund of Knowledge displays adequate knowledge of current events               Assessment/Plan:       Diagnoses and all orders for this visit:    MDD (major depressive disorder), recurrent severe, without psychosis ([facility] [address] )  -     escitalopram (LEXAPRO) 10 mg tablet; Take 1 tablet (10 mg total) by mouth daily  -     escitalopram (LEXAPRO) 5 mg tablet; Take with 10 mg for total 15 mg to taper down to 15 mg for 1 week    Mixed obsessional thoughts and acts  -     escitalopram (LEXAPRO) 10 mg tablet; Take 1 tablet (10 mg total) by mouth daily  -     escitalopram (LEXAPRO) 5 mg tablet; Take with 10 mg for total 15 mg to taper down to 15 mg for 1 week    Generalized anxiety disorder  -     escitalopram (LEXAPRO) 10 mg tablet;  Take 1 tablet (10 mg total) by mouth daily  -     escitalopram (LEXAPRO) 5 mg tablet; Take with 10 mg for total 15 mg to taper down to 15 mg for 1 week            Treatment Recommendations- Risks Benefits      Immediate Medical/Psychiatric/Psychotherapy Treatments and Any Precautions:Lexapro taper down to 15 mg for 1 week then 10 mg daily and stay on that dose   Risks, Benefits And Possible Side Effects Of Medications:  {PSYCH RISK, BENEFITS AND POSSIBLE SIDE EFFECTS (Optional):82629    Controlled Medication Discussion: Discussed with patient Black Box warning on concurrent use of benzodiazepines and opioid medications including sedation, respiratory depression, coma and death  Patient understands the risk of treatment with benzodiazepines in addition to opioids and wants to continue taking those medications  , Discussed with patient the risks of sedation, respiratory depression, impairment of ability to drive and potential for abuse and addiction related to treatment with benzodiazepine medications  The patient understands risk of treatment with benzodiazepine medications, agrees to not drive if feels impaired and agrees to take medications as prescribed  and The patient has been filling controlled prescriptions on time as prescribed to [facility]       Psychotherapy Provided:     Individual psychotherapy provided: No                    I spent 20 minutes directly with the patient during this visit      [address] acknowledges that she has consented to an online visit or consultation  She understands that the online visit is based solely on information provided by her, and that, in the absence of a face-to-face physical evaluation by the physician, the diagnosis she receives is both limited and provisional in terms of accuracy and completeness  This is not intended to replace a full medical face-to-face evaluation by the physician  Dyan Pollard understands and accepts these terms

## 2021-04-26 ENCOUNTER — TELEPHONE (OUTPATIENT)
Dept: PSYCHIATRY | Facility: CLINIC | Age: 46
End: 2021-04-26

## 2021-05-11 ENCOUNTER — TELEPHONE (OUTPATIENT)
Dept: PSYCHIATRY | Facility: CLINIC | Age: 46
End: 2021-05-11

## 2021-05-11 NOTE — TELEPHONE ENCOUNTER
Spoke with Aydee Littlejohn  She reports taking the Lexapro 10 mg for about a week and a half  Could she still be adjusting to the lower dose  She said she has increased anxiety, depression off and on and at times "feels irrational" in her thinking  She never started the Abilify  She does not want to layer drug after drug when the Lexapro isn't really working       She did state "she is afraid of medication side effects"       Please advise

## 2021-05-12 NOTE — TELEPHONE ENCOUNTER
Spoke with Zane  Advised to stay on the Lexapro 25 mg for 4 more weeks and then decrease to 10 mg   She verbalized understnding

## 2021-07-10 DIAGNOSIS — F33.2 MDD (MAJOR DEPRESSIVE DISORDER), RECURRENT SEVERE, WITHOUT PSYCHOSIS (HCC): ICD-10-CM

## 2021-07-10 RX ORDER — ARIPIPRAZOLE 2 MG/1
TABLET ORAL
Qty: 90 TABLET | Refills: 0 | Status: SHIPPED | OUTPATIENT
Start: 2021-07-10 | End: 2021-12-13 | Stop reason: SDUPTHER

## 2021-07-12 DIAGNOSIS — F33.2 MDD (MAJOR DEPRESSIVE DISORDER), RECURRENT SEVERE, WITHOUT PSYCHOSIS (HCC): ICD-10-CM

## 2021-07-12 DIAGNOSIS — F41.1 GENERALIZED ANXIETY DISORDER: ICD-10-CM

## 2021-07-12 DIAGNOSIS — F42.2 MIXED OBSESSIONAL THOUGHTS AND ACTS: ICD-10-CM

## 2021-07-12 RX ORDER — ESCITALOPRAM OXALATE 10 MG/1
TABLET ORAL
Qty: 90 TABLET | Refills: 0 | Status: SHIPPED | OUTPATIENT
Start: 2021-07-12 | End: 2021-10-05

## 2021-09-24 ENCOUNTER — TELEMEDICINE (OUTPATIENT)
Dept: PSYCHIATRY | Facility: CLINIC | Age: 46
End: 2021-09-24
Payer: COMMERCIAL

## 2021-09-24 DIAGNOSIS — F33.2 MDD (MAJOR DEPRESSIVE DISORDER), RECURRENT SEVERE, WITHOUT PSYCHOSIS (HCC): Primary | ICD-10-CM

## 2021-09-24 DIAGNOSIS — F42.2 MIXED OBSESSIONAL THOUGHTS AND ACTS: ICD-10-CM

## 2021-09-24 DIAGNOSIS — F41.1 GENERALIZED ANXIETY DISORDER: ICD-10-CM

## 2021-09-24 PROCEDURE — 99214 OFFICE O/P EST MOD 30 MIN: CPT | Performed by: PSYCHIATRY & NEUROLOGY

## 2021-09-24 RX ORDER — BUSPIRONE HYDROCHLORIDE 7.5 MG/1
7.5 TABLET ORAL 2 TIMES DAILY
COMMUNITY
End: 2022-02-15

## 2021-09-24 NOTE — BH TREATMENT PLAN
TREATMENT PLAN (Medication Management Only)        [facility] [employer]    Name and Date of Birth:  Shailesh Moctezuma 51 y o  1975  Date of Treatment Plan: September 24, 2021  Diagnosis/Diagnoses:    1  MDD (major depressive disorder), recurrent severe, without psychosis ([facility] [address] )    2  Mixed obsessional thoughts and acts    3  Generalized anxiety disorder      Strengths/Personal Resources for Self-Care: taking medications as prescribed, ability to communicate needs  Area/Areas of need (in own words): anxiety, anxiety symptoms, depression, depressive symptoms  1  Long Term Goal: continue improvement in depression  Target Date:3 months - 12/24/2021  Person/Persons responsible for completion of goal: Clara  2  Short Term Objective (s) - How will we reach this goal?:   A  Provider new recommended medication/dosage changes and/or continue medication(s): continue current medications as prescribed  B  N/A   C  N/A  Target Date:3 months - 12/24/2021  Person/Persons Responsible for Completion of Goal: Clara  Progress Towards Goals: continuing treatment  Treatment Modality: medication management every 3 months  Review due 180 days from date of this plan: 6 months - 3/24/2022  Expected length of service: ongoing treatment  My Physician/PA/NP and I have developed this plan together and I agree to work on the goals and objectives  I understand the treatment goals that were developed for my treatment

## 2021-09-24 NOTE — PSYCH
Virtual Regular Visit    Verification of patient location:    Patient is located in the following state in which I hold an active license [place]      Assessment/Plan:    Problem List Items Addressed This Visit        Other    Generalized anxiety disorder    Relevant Medications    busPIRone (BUSPAR) 7 5 mg tablet    MDD (major depressive disorder), recurrent severe, without psychosis ([facility] [address] ) - Primary    Relevant Medications    busPIRone (BUSPAR) 7 5 mg tablet    Mixed obsessional thoughts and acts                   Reason for visit is   Chief Complaint   Patient presents with    Virtual Regular Visit        Encounter provider Dimitry Garcia MD    Provider located at [address] [place] [id number] [phone]      Recent Visits  No visits were found meeting these conditions  Showing recent visits within past 7 days and meeting all other requirements  Today's Visits  Date Type Provider Dept   09/24/21 Telemedicine Dimitry Garcia MD [facility] [place] today's visits and meeting all other requirements  Future Appointments  No visits were found meeting these conditions  Showing future appointments within next 150 days and meeting all other requirements       The patient was identified by name and date of birth  Eleazar Dillons was informed that this is a telemedicine visit and that the visit is being conducted through[employer] and patient was informed that this is a secure, HIPAA-compliant platform  She agrees to proceed     My office door was closed  No one else was in the room  She acknowledged consent and understanding of privacy and security of the video platform  The patient has agreed to participate and understands they can discontinue the visit at any time  Patient is aware this is a billable service  Liza Caballero is a 51 y o  female with MDD and MURTAZA   Patient is compliant with her medications and denies side effects  She denies recent health changes or new medications  She stated that she doesn't feel Lexapro is helping and she is now considering starting Abilify 2 mg in combination  She is not sure if her mood changes are related to the hormonal changes of perimenopause  I suggested she schedules an appointment with her OB/GYN to discuss this  She agrees to start Abilify and she stated she was having high anxiety and restarted Buspar 7 5 mg bid using her own supplies at home  Will schedule a follow up visit in 8 weeks or sooner if needed  HPI     No past medical history on file  No past surgical history on file  Current Outpatient Medications   Medication Sig Dispense Refill    busPIRone (BUSPAR) 7 5 mg tablet Take 7 5 mg by mouth 2 (two) times a day      AFLURIA QUADRIVALENT 0 5 ML ALEXA       ARIPiprazole (ABILIFY) 2 mg tablet TAKE 1 TABLET BY MOUTH EVERY DAY 90 tablet 0    Cholecalciferol (VITAMIN D3) 2000 units capsule Take 1 capsule by mouth daily      escitalopram (LEXAPRO) 10 mg tablet TAKE 1 TABLET BY MOUTH EVERY DAY  STOP TAKING 20MG 90 tablet 0     No current facility-administered medications for this visit  No Known Allergies         Mood Anxiety, Depression and Emotional Lability   Behavior Compulsive Behavior and Impulsive Behavior   Thought Content Disturbing Thoughts, Feelings and Unreasonalbe or Irrational Fears   General Emotional Problems and Decreased Functioning   Personality Change in Personality   Other Psych Symptoms Normal   Constitutional Negative   ENT Negative   Cardiovascular Negative   Respiratory Negative   Gastrointestinal Negative   Genitourinary Negative   Musculoskeletal Negative   Integumentary Negative   Neurological Negative   Endocrine Normal    Other Symptoms Normal              Laboratory Results: No results found for this or any previous visit      Substance Abuse History:  Social History     Substance and Sexual Activity   Drug Use Not on file       Family Psychiatric History:   Family History   Problem Relation Age of Onset    No Known Problems Mother     Brain cancer Father 67    No Known Problems Sister     No Known Problems Daughter     No Known Problems Maternal Grandmother     No Known Problems Maternal Grandfather     No Known Problems Paternal Grandmother     Colon cancer Paternal Grandfather 46    No Known Problems Sister     No Known Problems Maternal Aunt        The following portions of the patient's history were reviewed and updated as appropriate: allergies, current medications, past family history, past medical history, past social history, past surgical history and problem list     Social History     Socioeconomic History    Marital status: /Civil Union     Spouse name: Not on file    Number of children: Not on file    Years of education: Not on file    Highest education level: Not on file   Occupational History    Not on file   Tobacco Use    Smoking status: Not on file   Substance and Sexual Activity    Alcohol use: Not on file    Drug use: Not on file    Sexual activity: Not on file   Other Topics Concern    Not on file   Social History Narrative    Not on file     Social Determinants of Health     Financial Resource Strain:     Difficulty of Paying Living Expenses:    Food Insecurity:     Worried About Running Out of Food in the Last Year:     Ran Out of Food in the Last Year:    Transportation Needs:     Lack of Transportation (Medical):      Lack of Transportation (Non-Medical):    Physical Activity:     Days of Exercise per Week:     Minutes of Exercise per Session:    Stress:     Feeling of Stress :    Social Connections:     Frequency of Communication with Friends and Family:     Frequency of Social Gatherings with Friends and Family:     Attends Jehovah's witness Services:     Active Member of Clubs or Organizations:     Attends Club or Organization Meetings:  Marital Status:    Intimate Partner Violence:     Fear of Current or Ex-Partner:     Emotionally Abused:     Physically Abused:     Sexually Abused:      Social History     Social History Narrative    Not on file       Objective:       Mental status:  Appearance calm and cooperative , adequate hygiene and grooming and good eye contact    Mood dysphoric, depressed and anxious   Affect affect was constricted   Speech a normal rate and fluent   Thought Processes coherent/organized and normal thought processes   Hallucinations no hallucinations present    Thought Content no delusions   Abnormal Thoughts no suicidal thoughts  and no homicidal thoughts    Orientation  oriented to person and place and time   Remote Memory short term memory intact and long term memory intact   Attention Span concentration impaired   Intellect Appears to be of Average Intelligence   Insight Limited insight   Judgement judgment was limited   Muscle Strength n/a   Language no difficulty naming common objects and no difficulty repeating a phrase    Fund of Knowledge displays adequate knowledge of current events               Assessment/Plan:       Diagnoses and all orders for this visit:    [facility]major [facility]    Mixed obsessional thoughts and acts    Generalized anxiety disorder    Other orders  -     busPIRone (BUSPAR) 7 5 mg tablet; Take 7 5 mg by mouth 2 (two) times a day            Treatment Recommendations- Risks Benefits      Immediate Medical/Psychiatric/Psychotherapy Treatments and Any Precautions: continue current treatment   Risks, Benefits And Possible Side Effects Of Medications:  {PSYCH RISK, BENEFITS AND POSSIBLE SIDE EFFECTS (Optional):01769    Controlled Medication Discussion: Discussed with patient Black Box warning on concurrent use of benzodiazepines and opioid medications including sedation, respiratory depression, coma and death   Patient understands the risk of treatment with benzodiazepines in addition to opioids and wants to continue taking those medications  , Discussed with patient the risks of sedation, respiratory depression, impairment of ability to drive and potential for abuse and addiction related to treatment with benzodiazepine medications  The patient understands risk of treatment with benzodiazepine medications, agrees to not drive if feels impaired and agrees to take medications as prescribed  and The patient has been filling controlled prescriptions on time as prescribed to [facility]       Psychotherapy Provided:     Individual psychotherapy provided: No                I spent 20 minutes directly with the patient during this visit    VIRTUAL VISIT Professor [address]ino[address] verbally agrees to participate in [employer]  Pt is aware that [employer] could be limited without vital signs or the ability to perform a full hands-on physical Eusebiokatrina Hester understands she or the provider may request at any time to terminate the video visit and request the patient to seek care or treatment in person

## 2021-09-27 ENCOUNTER — TELEPHONE (OUTPATIENT)
Dept: PSYCHIATRY | Facility: CLINIC | Age: 46
End: 2021-09-27

## 2021-10-05 DIAGNOSIS — F33.2 MDD (MAJOR DEPRESSIVE DISORDER), RECURRENT SEVERE, WITHOUT PSYCHOSIS (HCC): ICD-10-CM

## 2021-10-05 DIAGNOSIS — F42.2 MIXED OBSESSIONAL THOUGHTS AND ACTS: ICD-10-CM

## 2021-10-05 DIAGNOSIS — F41.1 GENERALIZED ANXIETY DISORDER: ICD-10-CM

## 2021-10-05 RX ORDER — ESCITALOPRAM OXALATE 10 MG/1
TABLET ORAL
Qty: 90 TABLET | Refills: 0 | Status: SHIPPED | OUTPATIENT
Start: 2021-10-05 | End: 2021-12-21

## 2021-10-07 ENCOUNTER — TELEPHONE (OUTPATIENT)
Dept: PSYCHIATRY | Facility: CLINIC | Age: 46
End: 2021-10-07

## 2021-11-05 ENCOUNTER — TELEPHONE (OUTPATIENT)
Dept: PSYCHIATRY | Facility: CLINIC | Age: 46
End: 2021-11-05

## 2021-12-13 DIAGNOSIS — F33.2 MDD (MAJOR DEPRESSIVE DISORDER), RECURRENT SEVERE, WITHOUT PSYCHOSIS (HCC): ICD-10-CM

## 2021-12-13 RX ORDER — ARIPIPRAZOLE 2 MG/1
2 TABLET ORAL DAILY
Qty: 90 TABLET | Refills: 0 | Status: SHIPPED | OUTPATIENT
Start: 2021-12-13 | End: 2022-02-15

## 2021-12-21 DIAGNOSIS — F42.2 MIXED OBSESSIONAL THOUGHTS AND ACTS: ICD-10-CM

## 2021-12-21 DIAGNOSIS — F33.2 MDD (MAJOR DEPRESSIVE DISORDER), RECURRENT SEVERE, WITHOUT PSYCHOSIS (HCC): ICD-10-CM

## 2021-12-21 DIAGNOSIS — F41.1 GENERALIZED ANXIETY DISORDER: ICD-10-CM

## 2021-12-21 RX ORDER — ESCITALOPRAM OXALATE 10 MG/1
TABLET ORAL
Qty: 90 TABLET | Refills: 0 | Status: SHIPPED | OUTPATIENT
Start: 2021-12-21 | End: 2022-02-15

## 2022-02-15 ENCOUNTER — TELEMEDICINE (OUTPATIENT)
Dept: PSYCHIATRY | Facility: CLINIC | Age: 47
End: 2022-02-15
Payer: COMMERCIAL

## 2022-02-15 DIAGNOSIS — F41.1 GENERALIZED ANXIETY DISORDER: ICD-10-CM

## 2022-02-15 DIAGNOSIS — F33.2 MDD (MAJOR DEPRESSIVE DISORDER), RECURRENT SEVERE, WITHOUT PSYCHOSIS (HCC): Primary | ICD-10-CM

## 2022-02-15 DIAGNOSIS — F42.2 MIXED OBSESSIONAL THOUGHTS AND ACTS: ICD-10-CM

## 2022-02-15 DIAGNOSIS — F90.2 ATTENTION DEFICIT HYPERACTIVITY DISORDER (ADHD), COMBINED TYPE: ICD-10-CM

## 2022-02-15 PROCEDURE — 99214 OFFICE O/P EST MOD 30 MIN: CPT | Performed by: PSYCHIATRY & NEUROLOGY

## 2022-02-15 RX ORDER — DEXTROAMPHETAMINE SACCHARATE, AMPHETAMINE ASPARTATE MONOHYDRATE, DEXTROAMPHETAMINE SULFATE AND AMPHETAMINE SULFATE 2.5; 2.5; 2.5; 2.5 MG/1; MG/1; MG/1; MG/1
10 CAPSULE, EXTENDED RELEASE ORAL EVERY MORNING
Qty: 30 CAPSULE | Refills: 0 | Status: SHIPPED | OUTPATIENT
Start: 2022-02-15 | End: 2022-03-17 | Stop reason: SDUPTHER

## 2022-02-15 NOTE — PSYCH
Virtual Regular Visit    Verification of patient location:    Patient is located in the following state in which I hold an active license [place]   Assessment/Plan:    Problem List Items Addressed This Visit        Other    Attention-deficit/hyperactivity disorder    Relevant Medications    sertraline (Zoloft) 50 mg tablet    amphetamine-dextroamphetamine (ADDERALL XR, 10MG,) 10 MG 24 hr capsule    MDD (major depressive disorder), recurrent severe, without psychosis ([facility] ) - Primary    Relevant Medications    sertraline (Zoloft) 50 mg tablet    amphetamine-dextroamphetamine (ADDERALL XR, 10MG,) 10 MG 24 hr capsule                   Reason for visit is   Chief Complaint   Patient presents with    Virtual Regular Visit        Encounter provider Gregorio Mendoza MD    Provider located at [address] [address] [phone]      Recent Visits  No visits were found meeting these conditions  Showing recent visits within past 7 days and meeting all other requirements  Today's Visits  Date Type Provider Dept   02/15/22 Telemedicine Gregorio Mendoza MD [facility] today's visits and meeting all other requirements  Future Appointments  No visits were found meeting these conditions  Showing future appointments within next 150 days and meeting all other requirements       The patient was identified by name and date of birth  Bagley Ryann was informed that this is a telemedicine visit and that the visit is being conducted throughPsychiatric Embedded and patient was informed this is a secure, HIPAA-complaint platform  She agrees to proceed     My office door was closed  No one else was in the room  She acknowledged consent and understanding of privacy and security of the video platform  The patient has agreed to participate and understands they can discontinue the visit at any time      Patient is aware this is a billable service  Callie Gunter is a 49 y o  female with MDD,MURTAZA,ADHD   Patient is compliant with her medications and denies side effects  She denies recent health changes or new medications  She stated that she didn't  feel Lexapro was helping and she tryed starting Abilify 2 mg in combination but it made no difference  She is not sure if her mood changes are related to the hormonal changes of perimenopause  I suggested she schedules an appointment with her OB/GYN to discuss this  She was having high anxiety and restarted Buspar 7 5 mg bid using her own supplies at home  Instead of pursuing dose increase in Abilify to 5 mg she insisted that her counselor had suggested for her to restart treatment for ADHD  We discussed how restarting Adderall could actually make her anxiety worse but that is her choice at this point  Will restart low dose Adderall and will d/c Abilify  She also agrees to change antidepressant and will d/c Lexapro and start trial of Sertraline 50 mg po qhs Will schedule a follow up visit in 8 weeks or sooner if needed  HPI     History reviewed  No pertinent past medical history  History reviewed  No pertinent surgical history  Current Outpatient Medications   Medication Sig Dispense Refill    AFLURIA QUADRIVALENT 0 5 ML ALEXA       ALPRAZolam (XANAX) 0 5 mg tablet Take 1 tablet (0 5 mg total) by mouth 2 (two) times a day as needed for anxiety 60 tablet 0    amphetamine-dextroamphetamine (ADDERALL XR, 10MG,) 10 MG 24 hr capsule Take 1 capsule (10 mg total) by mouth every morning Max Daily Amount: 10 mg 30 capsule 0    Cholecalciferol (VITAMIN D3) 2000 units capsule Take 1 capsule by mouth daily      sertraline (Zoloft) 50 mg tablet Take 1 tablet (50 mg total) by mouth daily 30 tablet 2     No current facility-administered medications for this visit          No Known Allergies    Review of Systems      Mood Anxiety, Depression and Emotional Lability   Behavior Compulsive Behavior and Impulsive Behavior   Thought Content Disturbing Thoughts, Feelings and Unreasonalbe or Irrational Fears   General Emotional Problems and Decreased Functioning   Personality Change in Personality   Other Psych Symptoms Normal   Constitutional Negative   ENT Negative   Cardiovascular Negative   Respiratory Negative   Gastrointestinal Negative   Genitourinary Negative   Musculoskeletal Negative   Integumentary Negative   Neurological Negative   Endocrine Normal    Other Symptoms Normal              Laboratory Results: No results found for this or any previous visit      Substance Abuse History:  Social History     Substance and Sexual Activity   Drug Use Not on file       Family Psychiatric History:   Family History   Problem Relation Age of Onset    No Known Problems Mother     Brain cancer Father 67    No Known Problems Sister     No Known Problems Daughter     No Known Problems Maternal Grandmother     No Known Problems Maternal Grandfather     No Known Problems Paternal Grandmother     Colon cancer Paternal Grandfather 46    No Known Problems Sister     No Known Problems Maternal Aunt        The following portions of the patient's history were reviewed and updated as appropriate: allergies, current medications, past family history, past medical history, past social history, past surgical history and problem list     Social History     Socioeconomic History    Marital status: /Civil Union     Spouse name: Not on file    Number of children: Not on file    Years of education: Not on file    Highest education level: Not on file   Occupational History    Not on file   Tobacco Use    Smoking status: Not on file    Smokeless tobacco: Not on file   Substance and Sexual Activity    Alcohol use: Not on file    Drug use: Not on file    Sexual activity: Not on file   Other Topics Concern    Not on file   Social History Narrative    Not on file     Social Determinants of Health     Financial Resource Strain: Not on file   Food Insecurity: Not on file   Transportation Needs: Not on file   Physical Activity: Not on file   Stress: Not on file   Social Connections: Not on file   Intimate Partner Violence: Not on file   Housing Stability: Not on file     Social History     Social History Narrative    Not on file       Objective:       Mental status:  Appearance calm and cooperative , adequate hygiene and grooming and good eye contact    Mood dysphoric   Affect affect was constricted   Speech a normal rate and fluent   Thought Processes coherent/organized and normal thought processes   Hallucinations no hallucinations present    Thought Content no delusions   Abnormal Thoughts no suicidal thoughts  and no homicidal thoughts    Orientation  oriented to person and place and time   Remote Memory short term memory intact and long term memory intact   Attention Span concentration impaired   Intellect Appears to be of Average Intelligence   Insight Limited insight   Judgement judgment was limited   Muscle Strength n/a   Language no difficulty naming common objects and no difficulty repeating a phrase    Fund of Knowledge displays adequate knowledge of current events, adequate fund of knowledge regarding past history and adequate fund of knowledge regarding vocabulary                Assessment/Plan:       Diagnoses and all orders for this visit:    MDD (major depressive disorder), recurrent severe, without psychosis ([facility] )  -     sertraline (Zoloft) 50 mg tablet; Take 1 tablet (50 mg total) by mouth daily    Attention deficit hyperactivity disorder (ADHD), combined type  -     amphetamine-dextroamphetamine (ADDERALL XR, 10MG,) 10 MG 24 hr capsule;  Take 1 capsule (10 mg total) by mouth every morning Max Daily Amount: 10 mg            Treatment Recommendations- Risks Benefits      Immediate Medical/Psychiatric/Psychotherapy Treatments and Any Precautions: d/c Lexapro, and Abilify, Start Zoloft and Adderall XR 10 mg po qam     Risks, Benefits And Possible Side Effects Of Medications:  {PSYCH RISK, BENEFITS AND POSSIBLE SIDE EFFECTS (Optional):32858    Controlled Medication Discussion: Discussed with patient Black Box warning on concurrent use of benzodiazepines and opioid medications including sedation, respiratory depression, coma and death  Patient understands the risk of treatment with benzodiazepines in addition to opioids and wants to continue taking those medications  , Discussed with patient the risks of sedation, respiratory depression, impairment of ability to drive and potential for abuse and addiction related to treatment with benzodiazepine medications  The patient understands risk of treatment with benzodiazepine medications, agrees to not drive if feels impaired and agrees to take medications as prescribed  and The patient has been filling controlled prescriptions on time as prescribed to [facility]       Psychotherapy Provided: No                        I spent 30 minutes directly with the patient during this visit    VIRTUAL VISIT Professor Espinoza 108 verbally agrees to participate in [employer]  Pt is aware that [employer] could be limited without vital signs or the ability to perform a full hands-on physical Ole Belle understands she or the provider may request at any time to terminate the video visit and request the patient to seek care or treatment in person

## 2022-02-16 ENCOUNTER — TELEPHONE (OUTPATIENT)
Dept: PSYCHIATRY | Facility: CLINIC | Age: 47
End: 2022-02-16

## 2022-03-17 DIAGNOSIS — F90.2 ATTENTION DEFICIT HYPERACTIVITY DISORDER (ADHD), COMBINED TYPE: ICD-10-CM

## 2022-03-17 RX ORDER — DEXTROAMPHETAMINE SACCHARATE, AMPHETAMINE ASPARTATE MONOHYDRATE, DEXTROAMPHETAMINE SULFATE AND AMPHETAMINE SULFATE 2.5; 2.5; 2.5; 2.5 MG/1; MG/1; MG/1; MG/1
10 CAPSULE, EXTENDED RELEASE ORAL EVERY MORNING
Qty: 30 CAPSULE | Refills: 0 | Status: SHIPPED | OUTPATIENT
Start: 2022-03-17 | End: 2022-04-19 | Stop reason: SDUPTHER

## 2022-03-25 ENCOUNTER — TELEPHONE (OUTPATIENT)
Dept: PSYCHIATRY | Facility: CLINIC | Age: 47
End: 2022-03-25

## 2022-03-28 NOTE — TELEPHONE ENCOUNTER
Spoke with Ana Chaidez  Advised according to our records it looks like the Abilify was discontinued at the virtual appointment on 2/15/22  Ana Chaidez reports honestly the connection was so bad and she really was not sure what Dr Brett Kaufman was saying  She stated she was getting low and started cutting the Abilify 2 mg in half and is currently taking 1/2 tab and has few more days left  She said last week her "anxiety was so bad" and she was unsure if it was from the Abilify being halved or what  She stated her anxiety is still high and she is unsure what to do  She wasn't sure if Zoloft should be increased or she was thinking of trying Wellbutrin       Please review

## 2022-04-01 NOTE — BH TREATMENT PLAN
TREATMENT PLAN (Medication Management Only)        [facility]    Name and Date of Birth:  Aubrie Grant 49 y o  1975  Date of Treatment Plan: April 1, 2022  Diagnosis/Diagnoses:    1  MDD (major depressive disorder), recurrent severe, without psychosis ([facility] [address] )    2  Attention deficit hyperactivity disorder (ADHD), combined type    3  Generalized anxiety disorder    4  Mixed obsessional thoughts and acts      Strengths/Personal Resources for Self-Care: taking medications as prescribed, ability to communicate needs  Area/Areas of need (in own words): anxiety, anxiety symptoms, depression, depressive symptoms  1  Long Term Goal: improve control of anxiety  Target Date:6 months - 10/1/2022  Person/Persons responsible for completion of goal: Clara Hahn  Short Term Objective (s) - How will we reach this goal?:   A  Provider new recommended medication/dosage changes and/or continue medication(s): continue current medications as prescribed  B  restart Adderall  Yanique Pong Lexapro, start Sertraline 50 mg po qhs   Target Date:6 months - 10/1/2022  Person/Persons Responsible for Completion of Goal: Clara  Progress Towards Goals: continuing treatment  Treatment Modality: medication management every 6 months  Review due 180 days from date of this plan: 6 months - 10/1/2022  Expected length of service: ongoing treatment  My Physician/PA/NP and I have developed this plan together and I agree to work on the goals and objectives  I understand the treatment goals that were developed for my treatment

## 2022-04-12 ENCOUNTER — TELEPHONE (OUTPATIENT)
Dept: PSYCHIATRY | Facility: CLINIC | Age: 47
End: 2022-04-12

## 2022-04-12 NOTE — TELEPHONE ENCOUNTER
Pt left a voicemail on the  line  the office asking to speak to a nurse but she did not specify the concern   Phone number [phone]    Thank you

## 2022-04-13 NOTE — TELEPHONE ENCOUNTER
Spoke with Zane  She reports she could "barely function" over the weekend dayday to her anxiety  She reports there are unknown triggers for this because the "situations in her life are the same as before" Nothing new  She said she had weaned off of the Abilify 2 mg  She was cutting the pills in half for about a week and a half  She has been completely off for about a week  Zane did state she "felt an up tick in her anxiety" when she cut the 2 mg Abilify in half"   She stated currently the symptoms are "off and on"     She stated she does not often use the Xanax because it makes her "feel dopey" She "can't remember the last time she took one"     She said the only other change was the increase in Zoloft from  but that was about a week and a half ago  She said she has never had any side effects from the Zoloft before  She stated anytime she has time to think she spirals and cries   She stated it feels like a "chemical withdraw"     Zane- [phone]

## 2022-04-13 NOTE — TELEPHONE ENCOUNTER
Per Dr Ange Garcia' message: We could start back Abilify if she prefers that  Spoke with Zane   She stated she would like Dr Santiago Theodore to send the Abilify 2 mg and she "will think about it" She will follow up with nursing with decision and how she is feeling

## 2022-04-19 DIAGNOSIS — F90.2 ATTENTION DEFICIT HYPERACTIVITY DISORDER (ADHD), COMBINED TYPE: ICD-10-CM

## 2022-04-19 RX ORDER — DEXTROAMPHETAMINE SACCHARATE, AMPHETAMINE ASPARTATE MONOHYDRATE, DEXTROAMPHETAMINE SULFATE AND AMPHETAMINE SULFATE 2.5; 2.5; 2.5; 2.5 MG/1; MG/1; MG/1; MG/1
10 CAPSULE, EXTENDED RELEASE ORAL EVERY MORNING
Qty: 30 CAPSULE | Refills: 0 | Status: SHIPPED | OUTPATIENT
Start: 2022-04-19 | End: 2022-05-20 | Stop reason: SDUPTHER

## 2022-05-20 DIAGNOSIS — F90.2 ATTENTION DEFICIT HYPERACTIVITY DISORDER (ADHD), COMBINED TYPE: ICD-10-CM

## 2022-05-20 RX ORDER — DEXTROAMPHETAMINE SACCHARATE, AMPHETAMINE ASPARTATE MONOHYDRATE, DEXTROAMPHETAMINE SULFATE AND AMPHETAMINE SULFATE 2.5; 2.5; 2.5; 2.5 MG/1; MG/1; MG/1; MG/1
10 CAPSULE, EXTENDED RELEASE ORAL EVERY MORNING
Qty: 30 CAPSULE | Refills: 0 | Status: SHIPPED | OUTPATIENT
Start: 2022-05-20 | End: 2022-06-15 | Stop reason: SDUPTHER

## 2022-05-20 NOTE — TELEPHONE ENCOUNTER
Chart and PMDP reviewed  Refill was sent to the patient's preferred pharmacy, covering patient's primary psychiatrist, Dr Hafsa Lopes

## 2022-06-15 DIAGNOSIS — F90.2 ATTENTION DEFICIT HYPERACTIVITY DISORDER (ADHD), COMBINED TYPE: ICD-10-CM

## 2022-06-15 RX ORDER — DEXTROAMPHETAMINE SACCHARATE, AMPHETAMINE ASPARTATE MONOHYDRATE, DEXTROAMPHETAMINE SULFATE AND AMPHETAMINE SULFATE 2.5; 2.5; 2.5; 2.5 MG/1; MG/1; MG/1; MG/1
10 CAPSULE, EXTENDED RELEASE ORAL EVERY MORNING
Qty: 30 CAPSULE | Refills: 0 | Status: SHIPPED | OUTPATIENT
Start: 2022-06-15 | End: 2022-06-15 | Stop reason: SDUPTHER

## 2022-06-15 RX ORDER — DEXTROAMPHETAMINE SACCHARATE, AMPHETAMINE ASPARTATE MONOHYDRATE, DEXTROAMPHETAMINE SULFATE AND AMPHETAMINE SULFATE 2.5; 2.5; 2.5; 2.5 MG/1; MG/1; MG/1; MG/1
10 CAPSULE, EXTENDED RELEASE ORAL EVERY MORNING
Qty: 30 CAPSULE | Refills: 0 | Status: SHIPPED | OUTPATIENT
Start: 2022-06-15 | End: 2022-06-23 | Stop reason: SDUPTHER

## 2022-06-15 NOTE — TELEPHONE ENCOUNTER
Pt stated that the med that she needs refilled its not due for almost a week but pt stated that she will be on vacation and she will run out while on vacation, she is asking if it can be sent over and approved

## 2022-06-15 NOTE — TELEPHONE ENCOUNTER
Patient called in requesting that the medication be sent to the updated pharmacy in [facility] as she will be there when the refill will be due to be filled

## 2022-06-23 DIAGNOSIS — F90.2 ATTENTION DEFICIT HYPERACTIVITY DISORDER (ADHD), COMBINED TYPE: ICD-10-CM

## 2022-06-23 RX ORDER — DEXTROAMPHETAMINE SACCHARATE, AMPHETAMINE ASPARTATE MONOHYDRATE, DEXTROAMPHETAMINE SULFATE AND AMPHETAMINE SULFATE 2.5; 2.5; 2.5; 2.5 MG/1; MG/1; MG/1; MG/1
10 CAPSULE, EXTENDED RELEASE ORAL EVERY MORNING
Qty: 30 CAPSULE | Refills: 0 | Status: SHIPPED | OUTPATIENT
Start: 2022-06-23

## 2022-06-25 DIAGNOSIS — F33.2 MDD (MAJOR DEPRESSIVE DISORDER), RECURRENT SEVERE, WITHOUT PSYCHOSIS (HCC): ICD-10-CM

## 2022-06-27 RX ORDER — SERTRALINE HYDROCHLORIDE 100 MG/1
TABLET, FILM COATED ORAL
Qty: 90 TABLET | Refills: 0 | Status: SHIPPED | OUTPATIENT
Start: 2022-06-27

## 2022-06-29 ENCOUNTER — TELEPHONE (OUTPATIENT)
Dept: PSYCHIATRY | Facility: CLINIC | Age: 47
End: 2022-06-29

## 2022-06-29 NOTE — TELEPHONE ENCOUNTER
Patient called and lvm requesting to cancel appt with provider on 7/5/22  She did not state she would like a call back to reschedule  Writer cancelled appt

## 2022-07-18 ENCOUNTER — TELEPHONE (OUTPATIENT)
Dept: PSYCHIATRY | Facility: CLINIC | Age: 47
End: 2022-07-18

## 2022-07-18 ENCOUNTER — DOCUMENTATION (OUTPATIENT)
Dept: PSYCHIATRY | Facility: CLINIC | Age: 47
End: 2022-07-18

## 2022-07-18 NOTE — TELEPHONE ENCOUNTER
Medical record request was received from Colten Chris MD with no time frame specified  Will be placed in Dr Shannon Dunbar by the end of the day

## 2022-07-18 NOTE — PSYCH
[facility]    Name and Date of Birth:  Alessandro Yates 49 y o  1975    Admission Date:  Discharge Date: 7/18/2022 Referral source: self    Discharge Type: patient transfer care elsewhere    Discharge Diagnosis:     No diagnosis found  Treating Physician: Beryl Salinas MD     Treatment Complications: Requested discharge  Prognosis at time of discharge: Fair    Presenting Problems/Pertinent Findings:      Leellen Bumpers initially presented for treatment due to depressive symptoms and anxiety symptoms  Primary complaints included DEPRESSIVE SYMPTOMS: depressed mood, sadness, low energy, low motivation, decreased interest, poor concentration, decreased memory and ANXIETY SYMPTOMS: daily anxiety symptoms, feeling nervous, worrying about everyday issues, worrying daily, poor concentration, feeling agitated, anxiety in social situations, anxiety attacks  Therapist: None    Course of Treatment: Psychiatric Evaluation and Medication Management    Summary of Treatment Progress:     Leellen Bumpers was seen for initial Psychiatric Evaluation and medication management  During the course of treatment she benefited from medication use to manage anxiety and depressive symptoms  At the last visit  she denied suicidal ideation, intent or plan at present, denied homicidal ideation, intent or plan at present  She denied auditory hallucinations, denied visual hallucinations, denied overt delusions  She denies any side effects from medications    Past Psychiatric History:     Past Inpatient Psychiatric Treatment:   No history of past inpatient psychiatric admissions  Past Outpatient Psychiatric Treatment:    Was in outpatient psychiatric treatment in the past with a psychiatrist  Past Suicide Attempts: no  Past Violent Behavior: no  Past Psychiatric Medication Trials: multiple psychiatric medication trials    Traumatic History: Abuse: none  Other Traumatic Events: none     Past Medical History:    No past medical history on file  No past surgical history on file  Allergies:    No Known Allergies    Substance Abuse History:     Social History     Substance and Sexual Activity   Drug Use Not on file     Social History     Substance and Sexual Activity   Alcohol Use Not on file       Family Psychiatric History:     Family History   Problem Relation Age of Onset    No Known Problems Mother     Brain cancer Father 67    No Known Problems Sister     No Known Problems Daughter     No Known Problems Maternal Grandmother     No Known Problems Maternal Grandfather     No Known Problems Paternal Grandmother     Colon cancer Paternal Grandfather 46    No Known Problems Sister     No Known Problems Maternal Aunt        Social History/Trauma History/Past Psychiatric History:    Social History     Socioeconomic History    Marital status: /Civil Union     Spouse name: Not on file    Number of children: Not on file    Years of education: Not on file    Highest education level: Not on file   Occupational History    Not on file   Tobacco Use    Smoking status: Not on file    Smokeless tobacco: Not on file   Substance and Sexual Activity    Alcohol use: Not on file    Drug use: Not on file    Sexual activity: Not on file   Other Topics Concern    Not on file   Social History Narrative    Not on file     Social Determinants of Health     Financial Resource Strain: Not on file   Food Insecurity: Not on file   Transportation Needs: Not on file   Physical Activity: Not on file   Stress: Not on file   Social Connections: Not on file   Intimate Partner Violence: Not on file   Housing Stability: Not on file       History Review:   The following portions of the patient's history were reviewed and updated as appropriate: allergies, current medications, past family history, past medical history, past social history, past surgical history and problem list    MENTAL STATUS EVALUATION (at time of most recent visit ):      Appearance age appropriate, casually dressed   Behavior cooperative, calm   Speech normal rate, normal volume, normal pitch   Mood dysphoric   Affect constricted   Thought Processes organized, goal directed   Associations intact associations   Thought Content no overt delusions   Perceptual Disturbances: no auditory hallucinations, no visual hallucinations   Abnormal Thoughts  Risk Potential Suicidal ideation - None  Homicidal ideation - None  Potential for aggression - No   Orientation oriented to person, place, time/date and situation   Memory recent and remote memory grossly intact   Consciousness alert and awake   Attention Span Concentration Span attention span and concentration appear shorter than expected for age   Intellect appears to be of average intelligence   Insight fair   Judgement fair   Muscle Strength and  Gait normal muscle strength and normal muscle tone, normal gait and normal balance   Motor activity no abnormal movements   Language no difficulty naming common objects, no difficulty repeating a phrase, no difficulty writing a sentence   Fund of Knowledge adequate knowledge of current events  adequate fund of knowledge regarding past history  adequate fund of knowledge regarding vocabulary    Pain none   Pain Scale 0       To what extent did Clara achieve her goals?: Some    Criteria for Discharge: Requested to follow up with another psychiatrist     Aftercare Recommendations:      Follow up with psychiatrist recommended      Discharge Medications:     Current Outpatient Medications:     AFLURIA QUADRIVALENT 0 5 ML ALEXA, , Disp: , Rfl:     ALPRAZolam (XANAX) 0 5 mg tablet, Take 1 tablet (0 5 mg total) by mouth 2 (two) times a day as needed for anxiety, Disp: 60 tablet, Rfl: 0    amphetamine-dextroamphetamine (ADDERALL XR, 10MG,) 10 MG 24 hr capsule, Take 1 capsule (10 mg total) by mouth every morning Max Daily Amount: 10 mg, Disp: 30 capsule, Rfl: 0    ARIPiprazole (ABILIFY) 2 mg tablet, Take 1 tablet (2 mg total) by mouth daily, Disp: 30 tablet, Rfl: 2    Cholecalciferol (VITAMIN D3) 2000 units capsule, Take 1 capsule by mouth daily, Disp: , Rfl:     meloxicam (MOBIC) 15 mg tablet, Take 15 mg by mouth daily, Disp: , Rfl:     sertraline (ZOLOFT) 100 mg tablet, TAKE 1 TABLET BY MOUTH EVERY DAY, Disp: 90 tablet, Rfl: 0     Describe ability and willingness to work and solve mental problems:     May have difficulty solving her mental health problems    Leticia Alvares MD 07/18/22

## 2022-07-20 ENCOUNTER — TELEPHONE (OUTPATIENT)
Dept: PSYCHIATRY | Facility: CLINIC | Age: 47
End: 2022-07-20

## 2022-07-20 NOTE — TELEPHONE ENCOUNTER
DISCHARGE LETTER for Deisy Oropeza MD (certified and regular) placed in outgoing mail on 07/20/22      Article #:  [id number]    Address:  [address] [place] [address]

## 2022-07-25 ENCOUNTER — TELEPHONE (OUTPATIENT)
Dept: PSYCHIATRY | Facility: CLINIC | Age: 47
End: 2022-07-25

## 2022-08-01 NOTE — TELEPHONE ENCOUNTER
Certificate for the Discharge Letter was signed/received on 7/23/22/2022  A copy has been scanned into Media

## 2022-09-21 DIAGNOSIS — F33.2 MDD (MAJOR DEPRESSIVE DISORDER), RECURRENT SEVERE, WITHOUT PSYCHOSIS (HCC): ICD-10-CM

## 2022-09-21 RX ORDER — SERTRALINE HYDROCHLORIDE 100 MG/1
TABLET, FILM COATED ORAL
Qty: 90 TABLET | Refills: 0 | Status: SHIPPED | OUTPATIENT
Start: 2022-09-21 | End: 2022-09-22 | Stop reason: SDUPTHER

## 2022-09-22 DIAGNOSIS — F33.2 MDD (MAJOR DEPRESSIVE DISORDER), RECURRENT SEVERE, WITHOUT PSYCHOSIS (HCC): ICD-10-CM

## 2022-09-22 RX ORDER — SERTRALINE HYDROCHLORIDE 100 MG/1
100 TABLET, FILM COATED ORAL DAILY
Qty: 90 TABLET | Refills: 0 | Status: SHIPPED | OUTPATIENT
Start: 2022-09-22

## 2022-12-08 ENCOUNTER — HOSPITAL ENCOUNTER (OUTPATIENT)
Dept: MAMMOGRAPHY | Facility: CLINIC | Age: 47
End: 2022-12-08

## 2022-12-08 ENCOUNTER — HOSPITAL ENCOUNTER (OUTPATIENT)
Dept: ULTRASOUND IMAGING | Facility: CLINIC | Age: 47
End: 2022-12-08

## 2022-12-08 VITALS — BODY MASS INDEX: 20.77 KG/M2 | WEIGHT: 110 LBS | HEIGHT: 61 IN

## 2022-12-08 DIAGNOSIS — N63.0 BREAST LUMP IN LOWER INNER QUADRANT: ICD-10-CM

## 2022-12-08 DIAGNOSIS — N60.19 FIBROCYSTIC BREAST: ICD-10-CM

## 2023-11-11 NOTE — PROGRESS NOTES
Treatment Plan not completed within required time limits due to: Shea Osborn  no show appointment on 7/16/2019 English